# Patient Record
Sex: FEMALE | Race: WHITE | Employment: FULL TIME | URBAN - METROPOLITAN AREA
[De-identification: names, ages, dates, MRNs, and addresses within clinical notes are randomized per-mention and may not be internally consistent; named-entity substitution may affect disease eponyms.]

---

## 2017-06-28 PROBLEM — M51.26 PROTRUDED LUMBAR DISC: Chronic | Status: ACTIVE | Noted: 2017-06-28

## 2017-06-28 PROBLEM — M46.1 SACROILIITIS (HCC): Chronic | Status: ACTIVE | Noted: 2017-06-28

## 2017-07-19 PROBLEM — M54.16 LUMBAR RADICULOPATHY: Status: ACTIVE | Noted: 2017-07-19

## 2018-04-23 ENCOUNTER — PROCEDURE VISIT (OUTPATIENT)
Dept: AUDIOLOGY | Age: 33
End: 2018-04-23
Payer: COMMERCIAL

## 2018-04-23 ENCOUNTER — OFFICE VISIT (OUTPATIENT)
Dept: ENT CLINIC | Age: 33
End: 2018-04-23
Payer: COMMERCIAL

## 2018-04-23 VITALS
HEIGHT: 63 IN | HEART RATE: 60 BPM | SYSTOLIC BLOOD PRESSURE: 112 MMHG | WEIGHT: 139.2 LBS | BODY MASS INDEX: 24.66 KG/M2 | DIASTOLIC BLOOD PRESSURE: 61 MMHG

## 2018-04-23 DIAGNOSIS — M26.621 ARTHRALGIA OF RIGHT TEMPOROMANDIBULAR JOINT: Primary | ICD-10-CM

## 2018-04-23 DIAGNOSIS — H93.19 TINNITUS, UNSPECIFIED LATERALITY: Primary | ICD-10-CM

## 2018-04-23 DIAGNOSIS — M26.622 ARTHRALGIA OF LEFT TEMPOROMANDIBULAR JOINT: ICD-10-CM

## 2018-04-23 PROCEDURE — 99213 OFFICE O/P EST LOW 20 MIN: CPT | Performed by: OTOLARYNGOLOGY

## 2018-04-23 PROCEDURE — 4004F PT TOBACCO SCREEN RCVD TLK: CPT | Performed by: OTOLARYNGOLOGY

## 2018-04-23 PROCEDURE — G8420 CALC BMI NORM PARAMETERS: HCPCS | Performed by: OTOLARYNGOLOGY

## 2018-04-23 PROCEDURE — 92557 COMPREHENSIVE HEARING TEST: CPT | Performed by: AUDIOLOGIST

## 2018-04-23 PROCEDURE — G8427 DOCREV CUR MEDS BY ELIG CLIN: HCPCS | Performed by: OTOLARYNGOLOGY

## 2018-04-23 PROCEDURE — 92567 TYMPANOMETRY: CPT | Performed by: AUDIOLOGIST

## 2018-04-23 RX ORDER — IBUPROFEN 200 MG
200 TABLET ORAL EVERY 6 HOURS PRN
COMMUNITY
End: 2020-09-15 | Stop reason: ALTCHOICE

## 2018-04-23 RX ORDER — MELOXICAM 7.5 MG/1
7.5 TABLET ORAL 2 TIMES DAILY
Qty: 30 TABLET | Refills: 3 | Status: SHIPPED
Start: 2018-04-23 | End: 2022-06-27

## 2018-05-07 ENCOUNTER — TELEPHONE (OUTPATIENT)
Dept: ENT CLINIC | Age: 33
End: 2018-05-07

## 2018-05-10 ASSESSMENT — ENCOUNTER SYMPTOMS
SINUS PAIN: 0
SHORTNESS OF BREATH: 0
SORE THROAT: 0
COUGH: 0
DOUBLE VISION: 0
STRIDOR: 0
BLURRED VISION: 0
HEARTBURN: 0
VOMITING: 0

## 2020-02-27 ENCOUNTER — TELEPHONE (OUTPATIENT)
Dept: ENT CLINIC | Age: 35
End: 2020-02-27

## 2020-02-27 NOTE — TELEPHONE ENCOUNTER
Patient called in to be seen today or tomorrow, patient states she has a lump on the back of her head she has been going back and forth about with her PCP and Dr. Adia Ruvalcaba, she states it went away and now it is back and she has been dealing with this for 3 years, patient would like an appt asap.  Best call back is [phone]

## 2020-09-15 ENCOUNTER — HOSPITAL ENCOUNTER (EMERGENCY)
Age: 35
Discharge: HOME | End: 2020-09-15
Payer: COMMERCIAL

## 2020-09-15 VITALS
WEIGHT: 120 LBS | SYSTOLIC BLOOD PRESSURE: 126 MMHG | OXYGEN SATURATION: 99 % | TEMPERATURE: 98.4 F | RESPIRATION RATE: 14 BRPM | DIASTOLIC BLOOD PRESSURE: 58 MMHG | BODY MASS INDEX: 22.08 KG/M2 | HEART RATE: 64 BPM | HEIGHT: 62 IN

## 2020-09-15 PROCEDURE — 96375 TX/PRO/DX INJ NEW DRUG ADDON: CPT

## 2020-09-15 PROCEDURE — 2580000003 HC RX 258: Performed by: PHYSICIAN ASSISTANT

## 2020-09-15 PROCEDURE — 99283 EMERGENCY DEPT VISIT LOW MDM: CPT

## 2020-09-15 PROCEDURE — 6360000002 HC RX W HCPCS: Performed by: PHYSICIAN ASSISTANT

## 2020-09-15 PROCEDURE — 96374 THER/PROPH/DIAG INJ IV PUSH: CPT

## 2020-09-15 RX ORDER — IBUPROFEN 600 MG/1
600 TABLET ORAL EVERY 6 HOURS PRN
Qty: 40 TABLET | Refills: 0 | Status: SHIPPED | OUTPATIENT
Start: 2020-09-15

## 2020-09-15 RX ORDER — DIPHENHYDRAMINE HYDROCHLORIDE 50 MG/ML
25 INJECTION INTRAMUSCULAR; INTRAVENOUS ONCE
Status: COMPLETED | OUTPATIENT
Start: 2020-09-15 | End: 2020-09-15

## 2020-09-15 RX ORDER — METOCLOPRAMIDE HYDROCHLORIDE 5 MG/ML
10 INJECTION INTRAMUSCULAR; INTRAVENOUS ONCE
Status: COMPLETED | OUTPATIENT
Start: 2020-09-15 | End: 2020-09-15

## 2020-09-15 RX ORDER — DIAZEPAM 5 MG/1
5 TABLET ORAL EVERY 6 HOURS PRN
Qty: 10 TABLET | Refills: 0 | Status: SHIPPED | OUTPATIENT
Start: 2020-09-15 | End: 2020-09-18

## 2020-09-15 RX ORDER — DIAZEPAM 5 MG/ML
5 INJECTION, SOLUTION INTRAMUSCULAR; INTRAVENOUS ONCE
Status: COMPLETED | OUTPATIENT
Start: 2020-09-15 | End: 2020-09-15

## 2020-09-15 RX ORDER — 0.9 % SODIUM CHLORIDE 0.9 %
1000 INTRAVENOUS SOLUTION INTRAVENOUS ONCE
Status: COMPLETED | OUTPATIENT
Start: 2020-09-15 | End: 2020-09-15

## 2020-09-15 RX ORDER — KETOROLAC TROMETHAMINE 30 MG/ML
30 INJECTION, SOLUTION INTRAMUSCULAR; INTRAVENOUS ONCE
Status: COMPLETED | OUTPATIENT
Start: 2020-09-15 | End: 2020-09-15

## 2020-09-15 RX ADMIN — SODIUM CHLORIDE 1000 ML: 9 INJECTION, SOLUTION INTRAVENOUS at 19:12

## 2020-09-15 RX ADMIN — DIAZEPAM 5 MG: 5 INJECTION, SOLUTION INTRAMUSCULAR; INTRAVENOUS at 20:02

## 2020-09-15 RX ADMIN — KETOROLAC TROMETHAMINE 30 MG: 30 INJECTION, SOLUTION INTRAMUSCULAR; INTRAVENOUS at 19:13

## 2020-09-15 RX ADMIN — METOCLOPRAMIDE HYDROCHLORIDE 10 MG: 5 INJECTION INTRAMUSCULAR; INTRAVENOUS at 19:13

## 2020-09-15 RX ADMIN — DIPHENHYDRAMINE HYDROCHLORIDE 25 MG: 50 INJECTION, SOLUTION INTRAMUSCULAR; INTRAVENOUS at 19:13

## 2020-09-15 ASSESSMENT — PAIN DESCRIPTION - LOCATION: LOCATION: HEAD

## 2020-09-15 ASSESSMENT — PAIN SCALES - GENERAL: PAINLEVEL_OUTOF10: 9

## 2020-09-15 ASSESSMENT — PAIN - FUNCTIONAL ASSESSMENT: PAIN_FUNCTIONAL_ASSESSMENT: PREVENTS OR INTERFERES SOME ACTIVE ACTIVITIES AND ADLS

## 2020-09-15 ASSESSMENT — PAIN DESCRIPTION - DESCRIPTORS: DESCRIPTORS: ACHING;DISCOMFORT

## 2020-09-15 NOTE — ED PROVIDER NOTES
[facility]                                                                                                                                      Department of Emergency Medicine   ED  Provider Note  Admit Date/RoomTime: 9/15/2020  6:41 PM  ED Room: 25/25        HPI:  9/15/20,   Time: 7:46 PM EDT         April D Alan Steele is a 38 y.o. female presenting to the ED for right sided neck pain radiating to head, beginning 5 days ago. The complaint has been persistent, moderate in severity, and worsened by movement of head and neck. The patient states that since last Thursday she has had right-sided neck pain that radiates to the right side of her head. She states that the pain is aggravated with any movement. There was no fall or trauma. She denies any recent history of headaches. She states that when she was much younger she did have some mild sinus issues and allergies. She did have a low-grade temp last week and was sent home from work. She has some mild nasal congestion but no significant facial pain or postnasal drip. The patient states that she is tried over-the-counter meds without relief of her headache. Denies visual changes.   She has had mild nausea but no vomiting or fever    ROS:     Constitutional: Negative for fever and chills  HENT: Negative for ear pain, sore throat and sinus pressure  Eyes: Negative for pain, discharge and redness  Respiratory:  Negative for shortness of breath, cough and wheezing  Cardiovascular: Negative for CP, edema or palpitations  Gastrointestinal: Negative for nausea, vomiting, diarrhea and abdominal distention  Genitourinary: Negative for dysuria and frequency  Musculoskeletal: Negative for back pain and arthralgia  Skin: Negative for rash and wound  Neurological: Negative for weakness and headaches  Hematological: Negative for adenopathy    All other systems reviewed and are negative      -------------------------------- PAST HISTORY ----------------------------------  Past Medical History:  has a past medical history of Chronic pain, Marijuana use, and OCD (obsessive compulsive disorder). Past Surgical History:  has a past surgical history that includes Appendectomy; Tubal ligation; Foot surgery; Ovarian cyst surgery; Nose surgery (Bilateral, 8/13/15); and other surgical history (N/A, 07/19/2017). Social History:  reports that she has been smoking cigarettes. She has a 20.00 pack-year smoking history. She has never used smokeless tobacco. She reports current alcohol use. She reports current drug use. Drug: Marijuana. Family History: family history is not on file. The patients home medications have been reviewed. Allergies: Tape [adhesive tape]    --------------------------------- RESULTS ------------------------------------------  All laboratory and radiology results have been personally reviewed by myself   LABS:  No results found for this visit on 09/15/20. RADIOLOGY:  Interpreted by Radiologist.  No orders to display       ----------------- NURSING NOTES AND VITALS REVIEWED ---------------   The nursing notes within the ED encounter and vital signs as below have been reviewed. BP (!) 126/58   Pulse 64   Temp 98.4 °F (36.9 °C) (Oral)   Resp 14   Ht 5' 2\" (1.575 m)   Wt 120 lb (54.4 kg)   LMP 03/20/2018   SpO2 99%   BMI 21.95 kg/m²   Oxygen Saturation Interpretation: Normal      --------------------------------PHYSICAL EXAM------------------------------------      Constitutional/General: Alert and oriented x3, well appearing, non toxic in NAD  Head: NC/AT  Eyes: PERRL, EOMI  Mouth: Oropharynx clear, handling secretions, no trismus  Neck: Supple, full ROM, no meningeal signs  Pulmonary: Lungs clear to auscultation bilaterally, no wheezes, rales, or rhonchi. Not in respiratory distress  Cardiovascular:  Regular rate and rhythm, no murmurs, gallops, or rubs. 2+ distal pulses  Abdomen: Soft, + BS. No distension. Nontender. No palpable rigidity, rebound or guarding  Extremities: Moves all extremities x 4. Warm and well perfused  Skin: warm and dry without rash  Neurologic: GCS 15,  Intact. No focal deficits  Psych: Normal Affect      ------------------------ ED COURSE/MEDICAL DECISION MAKING----------------------  Medications   0.9 % sodium chloride bolus (0 mLs Intravenous Stopped 9/15/20 2012)   ketorolac (TORADOL) injection 30 mg (30 mg Intravenous Given 9/15/20 1913)   metoclopramide (REGLAN) injection 10 mg (10 mg Intravenous Given 9/15/20 1913)   diphenhydrAMINE (BENADRYL) injection 25 mg (25 mg Intravenous Given 9/15/20 1913)   diazePAM (VALIUM) injection 5 mg (5 mg Intravenous Given 9/15/20 2002)         Medical Decision Making:    Patient is feeling much better after the meds here. She got significant relief initially and then was still having some right-sided neck discomfort. She was then given a muscle relaxer. Plan discharge home with Motrin and Valium. She can use ice or heat as needed. Follow-up with PCP advised if persistent symptoms       Counseling: The emergency provider has spoken with the patient and discussed todays results, in addition to providing specific details for the plan of care and counseling regarding the diagnosis and prognosis. Questions are answered at this time and they are agreeable with the plan.      ------------------------ IMPRESSION AND DISPOSITION -------------------------------    IMPRESSION  1. Tension headache    2.  Spasm of muscle        DISPOSITION  Disposition: Discharge to home  Patient condition is stable                   Charles Celestin PA-C  09/15/20 2036

## 2020-09-16 NOTE — ED NOTES
Discharge instructions given, medications and follow up instructions reviewed. Patient verbalized understanding, no other noted or stated problems at this time. Patient will follow up with physicians as directed.       Link Eckert RN  09/15/20 0930

## 2022-06-27 ENCOUNTER — HOSPITAL ENCOUNTER (EMERGENCY)
Age: 37
Discharge: HOME | End: 2022-06-27
Payer: COMMERCIAL

## 2022-06-27 ENCOUNTER — APPOINTMENT (OUTPATIENT)
Dept: GENERAL RADIOLOGY | Age: 37
End: 2022-06-27
Payer: COMMERCIAL

## 2022-06-27 VITALS
SYSTOLIC BLOOD PRESSURE: 102 MMHG | BODY MASS INDEX: 22.15 KG/M2 | RESPIRATION RATE: 16 BRPM | WEIGHT: 125 LBS | HEART RATE: 62 BPM | DIASTOLIC BLOOD PRESSURE: 56 MMHG | TEMPERATURE: 98.4 F | HEIGHT: 63 IN | OXYGEN SATURATION: 100 %

## 2022-06-27 DIAGNOSIS — S93.401A SPRAIN OF RIGHT ANKLE, UNSPECIFIED LIGAMENT, INITIAL ENCOUNTER: Primary | ICD-10-CM

## 2022-06-27 DIAGNOSIS — S93.601A SPRAIN OF RIGHT FOOT, INITIAL ENCOUNTER: ICD-10-CM

## 2022-06-27 PROCEDURE — 99283 EMERGENCY DEPT VISIT LOW MDM: CPT

## 2022-06-27 PROCEDURE — 73630 X-RAY EXAM OF FOOT: CPT

## 2022-06-27 PROCEDURE — 73610 X-RAY EXAM OF ANKLE: CPT

## 2022-06-27 PROCEDURE — 6370000000 HC RX 637 (ALT 250 FOR IP): Performed by: NURSE PRACTITIONER

## 2022-06-27 RX ORDER — IBUPROFEN 600 MG/1
600 TABLET ORAL 3 TIMES DAILY PRN
Qty: 30 TABLET | Refills: 0 | Status: SHIPPED | OUTPATIENT
Start: 2022-06-27

## 2022-06-27 RX ORDER — IBUPROFEN 800 MG/1
800 TABLET ORAL ONCE
Status: COMPLETED | OUTPATIENT
Start: 2022-06-27 | End: 2022-06-27

## 2022-06-27 RX ADMIN — IBUPROFEN 800 MG: 800 TABLET, FILM COATED ORAL at 19:05

## 2022-06-27 ASSESSMENT — PAIN DESCRIPTION - ORIENTATION: ORIENTATION: RIGHT

## 2022-06-27 ASSESSMENT — PAIN DESCRIPTION - DESCRIPTORS: DESCRIPTORS: THROBBING;SHOOTING;DISCOMFORT

## 2022-06-27 ASSESSMENT — PAIN - FUNCTIONAL ASSESSMENT: PAIN_FUNCTIONAL_ASSESSMENT: 0-10

## 2022-06-27 ASSESSMENT — PAIN SCALES - GENERAL
PAINLEVEL_OUTOF10: 7
PAINLEVEL_OUTOF10: 6

## 2022-06-27 ASSESSMENT — PAIN DESCRIPTION - LOCATION: LOCATION: FOOT

## 2022-06-27 NOTE — Clinical Note
Ashley Castro was seen and treated in our emergency department on 6/27/2022. She may return to work on 07/01/2022. If you have any questions or concerns, please don't hesitate to call.       Tracie Masters, JANEL - CNP

## 2022-06-28 NOTE — ED PROVIDER NOTES
[facility]  Department of Emergency Medicine   ED  Encounter Note  Admit Date/RoomTime: 2022  6:51 PM  ED Room: BRYN/BRYN    NAME: Ashley Espana  : 1985  MRN: [id number]     Chief Complaint:  Foot Injury (right heel pain after jumping in pool)    History of Present Illness         Ashley Dobbs is a 44 y.o. old female presenting to the emergency department by private vehicle, for traumatic Right foot and ankle pain which occured several hour(s) prior to arrival.  The complaint is due to being jumping into a pool chasing after her child who had fallen in accidentally. Patient states that when she got out of the pool she started to have pain in her heel and foot. Since onset the symptoms have been waxing and waning with inability to bear weight. Patient has no prior history of pain/injury with regards to today's visit. Her pain is aggraveated by certain movements or pressure on or palpation of painful area and relieved by nothing, as no treatment has been provided prior to this visit. She denies any head injury, headache, loss of consciousness, confusion, dizziness, neck pain, chest pain, abdominal pain, back pain, extremity injury, numbness or weakness. Tetanus Status: up to date. ROS   Pertinent positives and negatives are stated within HPI, all other systems reviewed and are negative. Past Medical History:  has a past medical history of Chronic pain, Marijuana use, and OCD (obsessive compulsive disorder). Surgical History:  has a past surgical history that includes Appendectomy; Tubal ligation; Foot surgery; Ovarian cyst surgery; Nose surgery (Bilateral, 8/13/15); and other surgical history (N/A, 2017). Social History:  reports that she has been smoking cigarettes. She has a 20.00 pack-year smoking history. She has never used smokeless tobacco. She reports current alcohol use. She reports current drug use. Drug: Marijuana [employer]).     Family History: family history is not on file. Allergies: Tape [adhesive tape]    Physical Exam   Oxygen Saturation Interpretation: Normal.        ED Triage Vitals [06/27/22 1643]   BP Temp Temp src Heart Rate Resp SpO2 Height Weight   (!) 102/56 98.4 °F (36.9 °C) -- 62 16 100 % 5' 3\" (1.6 m) 125 lb (56.7 kg)         Constitutional:  Alert, development consistent with age. Neck:  Normal ROM. Supple. Right Ankle: diffusely across ankle              Tenderness:  mild. Swelling: None. Deformity: no deformity observed/palpated. ROM: full range with pain. Skin:  Tenderness, Achilles tendon is intact. There is no erythema ecchymosis edema hematoma laceration or abrasions noted       Neurovascular: Motor deficit: none. Sensory deficit:   none. Pulse deficit: none. Capillary refill: normal.  Right Knee:              Tenderness:  none. Swelling: None. Deformity: no deformity observed/palpated. ROM: full range of motion. Skin:  no wounds, erythema, or swelling. Right Foot: diffusely across entire foot              Tenderness:  mild. Swelling: None. Deformity: no deformity observed/palpated. ROM: full range of motion. Skin:  no wounds, erythema, or swelling  Gait:  normal.   Lymphatics: No lymphangitis or adenopathy noted. Neurological:  Oriented. Motor functions intact. Lab / Imaging Results   (All laboratory and radiology results have been personally reviewed by myself)  Labs:  No results found for this visit on 06/27/22. Imaging: All Radiology results interpreted by Radiologist unless otherwise noted. XR FOOT RIGHT (MIN 3 VIEWS)   Final Result   No fracture or dislocation of the right ankle or foot. XR ANKLE RIGHT (MIN 3 VIEWS)   Final Result   No fracture or dislocation of the right ankle or foot.            ED Course / Medical Decision Making     Medications   ibuprofen (ADVIL;MOTRIN) tablet 800 mg (800 mg Oral Given 6/27/22 1905)        Consults:   None    Procedure(s):  None    MDM:      Imaging was obtained based on moderate suspicion for fracture / bony abnormality as per history/physical findings. Plan is subsequently for symptom control, limited use and immobilization with outpatient follow-up with pcp as instructed in d/c instructions and with on-call orthopaedist as instructed in d/c instructions. Was placed in Ace wrap Aircast and given crutches for her symptoms. Patient educated on ice anti-inflammatory medication and elevation. Patient agreeable to plan of care she is nontoxic in appearance and in no distress. Patient at this time is agreeable with the plan of care. Plan of Care/Counseling:  JANEL Wright CNP reviewed today's visit with the patient in addition to providing specific details for the plan of care and counseling regarding the diagnosis and prognosis. Questions are answered at this time and are agreeable with the plan. Assessment      1. Sprain of right ankle, unspecified ligament, initial encounter    2. Sprain of right foot, initial encounter      Plan   Discharged home. Patient condition is good    New Medications     Discharge Medication List as of 6/27/2022  7:42 PM      START taking these medications    Details   !! ibuprofen (ADVIL;MOTRIN) 600 MG tablet Take 1 tablet by mouth 3 times daily as needed for Pain, Disp-30 tablet, R-0Normal       !! - Potential duplicate medications found. Please discuss with provider. Electronically signed by JANEL rWight CNP   DD: 6/28/22  **This report was transcribed using voice recognition software. Every effort was made to ensure accuracy; however, inadvertent computerized transcription errors may be present.   END OF ED PROVIDER NOTE       JANEL Shankar CNP  06/28/22 6304

## 2022-07-21 ENCOUNTER — HOSPITAL ENCOUNTER (OUTPATIENT)
Dept: GENERAL RADIOLOGY | Age: 37
Discharge: HOME | End: 2022-07-23
Payer: COMMERCIAL

## 2022-07-21 DIAGNOSIS — Z13.820 SCREENING FOR OSTEOPOROSIS: ICD-10-CM

## 2022-07-21 PROCEDURE — 77080 DXA BONE DENSITY AXIAL: CPT

## 2023-02-21 ENCOUNTER — HOSPITAL ENCOUNTER (EMERGENCY)
Age: 38
Discharge: HOME | End: 2023-02-21
Attending: EMERGENCY MEDICINE
Payer: COMMERCIAL

## 2023-02-21 ENCOUNTER — APPOINTMENT (OUTPATIENT)
Dept: GENERAL RADIOLOGY | Age: 38
End: 2023-02-21
Payer: COMMERCIAL

## 2023-02-21 ENCOUNTER — APPOINTMENT (OUTPATIENT)
Dept: CT IMAGING | Age: 38
End: 2023-02-21
Payer: COMMERCIAL

## 2023-02-21 VITALS
TEMPERATURE: 98.6 F | BODY MASS INDEX: 21.26 KG/M2 | SYSTOLIC BLOOD PRESSURE: 105 MMHG | WEIGHT: 120 LBS | RESPIRATION RATE: 16 BRPM | OXYGEN SATURATION: 98 % | HEART RATE: 78 BPM | DIASTOLIC BLOOD PRESSURE: 60 MMHG

## 2023-02-21 DIAGNOSIS — G89.29 ACUTE EXACERBATION OF CHRONIC LOW BACK PAIN: Primary | ICD-10-CM

## 2023-02-21 DIAGNOSIS — M54.50 ACUTE EXACERBATION OF CHRONIC LOW BACK PAIN: Primary | ICD-10-CM

## 2023-02-21 LAB
INFLUENZA A: NOT DETECTED
INFLUENZA B: NOT DETECTED
SARS-COV-2 RNA, RT PCR: NOT DETECTED

## 2023-02-21 PROCEDURE — 87636 SARSCOV2 & INF A&B AMP PRB: CPT

## 2023-02-21 PROCEDURE — 72131 CT LUMBAR SPINE W/O DYE: CPT

## 2023-02-21 PROCEDURE — 6370000000 HC RX 637 (ALT 250 FOR IP)

## 2023-02-21 PROCEDURE — 96372 THER/PROPH/DIAG INJ SC/IM: CPT

## 2023-02-21 PROCEDURE — 71046 X-RAY EXAM CHEST 2 VIEWS: CPT

## 2023-02-21 PROCEDURE — 6360000002 HC RX W HCPCS

## 2023-02-21 PROCEDURE — 99284 EMERGENCY DEPT VISIT MOD MDM: CPT

## 2023-02-21 PROCEDURE — 2580000003 HC RX 258

## 2023-02-21 PROCEDURE — 96374 THER/PROPH/DIAG INJ IV PUSH: CPT

## 2023-02-21 PROCEDURE — 96375 TX/PRO/DX INJ NEW DRUG ADDON: CPT

## 2023-02-21 RX ORDER — FENTANYL CITRATE 50 UG/ML
25 INJECTION, SOLUTION INTRAMUSCULAR; INTRAVENOUS ONCE
Status: COMPLETED | OUTPATIENT
Start: 2023-02-21 | End: 2023-02-21

## 2023-02-21 RX ORDER — 0.9 % SODIUM CHLORIDE 0.9 %
1000 INTRAVENOUS SOLUTION INTRAVENOUS ONCE
Status: COMPLETED | OUTPATIENT
Start: 2023-02-21 | End: 2023-02-21

## 2023-02-21 RX ORDER — KETOROLAC TROMETHAMINE 30 MG/ML
15 INJECTION, SOLUTION INTRAMUSCULAR; INTRAVENOUS ONCE
Status: COMPLETED | OUTPATIENT
Start: 2023-02-21 | End: 2023-02-21

## 2023-02-21 RX ORDER — METHYLPREDNISOLONE SODIUM SUCCINATE 125 MG/2ML
60 INJECTION, POWDER, LYOPHILIZED, FOR SOLUTION INTRAMUSCULAR; INTRAVENOUS DAILY
Status: DISCONTINUED | OUTPATIENT
Start: 2023-02-21 | End: 2023-02-21 | Stop reason: HOSPADM

## 2023-02-21 RX ORDER — ORPHENADRINE CITRATE 100 MG/1
100 TABLET, EXTENDED RELEASE ORAL 2 TIMES DAILY
Qty: 14 TABLET | Refills: 0 | Status: SHIPPED | OUTPATIENT
Start: 2023-02-21 | End: 2023-02-28

## 2023-02-21 RX ORDER — PREDNISONE 10 MG/1
40 TABLET ORAL DAILY
Qty: 20 TABLET | Refills: 0 | Status: SHIPPED | OUTPATIENT
Start: 2023-02-21 | End: 2023-02-26

## 2023-02-21 RX ORDER — LIDOCAINE AND PRILOCAINE 25; 25 MG/G; MG/G
CREAM TOPICAL ONCE
Status: DISCONTINUED | OUTPATIENT
Start: 2023-02-21 | End: 2023-02-21

## 2023-02-21 RX ORDER — LIDOCAINE 4 G/G
PATCH TOPICAL
Status: COMPLETED
Start: 2023-02-21 | End: 2023-02-21

## 2023-02-21 RX ORDER — LIDOCAINE 50 MG/G
1 PATCH TOPICAL EVERY 24 HOURS
Qty: 10 PATCH | Refills: 0 | Status: SHIPPED | OUTPATIENT
Start: 2023-02-21 | End: 2023-03-03

## 2023-02-21 RX ORDER — ORPHENADRINE CITRATE 30 MG/ML
60 INJECTION INTRAMUSCULAR; INTRAVENOUS ONCE
Status: COMPLETED | OUTPATIENT
Start: 2023-02-21 | End: 2023-02-21

## 2023-02-21 RX ADMIN — SODIUM CHLORIDE 1000 ML: 9 INJECTION, SOLUTION INTRAVENOUS at 10:11

## 2023-02-21 RX ADMIN — KETOROLAC TROMETHAMINE 15 MG: 30 INJECTION, SOLUTION INTRAMUSCULAR; INTRAVENOUS at 10:12

## 2023-02-21 RX ADMIN — ORPHENADRINE CITRATE 60 MG: 30 INJECTION INTRAMUSCULAR; INTRAVENOUS at 10:12

## 2023-02-21 RX ADMIN — FENTANYL CITRATE 25 MCG: 50 INJECTION, SOLUTION INTRAMUSCULAR; INTRAVENOUS at 10:12

## 2023-02-21 RX ADMIN — METHYLPREDNISOLONE SODIUM SUCCINATE 60 MG: 125 INJECTION, POWDER, FOR SOLUTION INTRAMUSCULAR; INTRAVENOUS at 10:13

## 2023-02-21 ASSESSMENT — PAIN DESCRIPTION - PAIN TYPE: TYPE: ACUTE PAIN;CHRONIC PAIN

## 2023-02-21 ASSESSMENT — PAIN SCALES - GENERAL
PAINLEVEL_OUTOF10: 7
PAINLEVEL_OUTOF10: 10
PAINLEVEL_OUTOF10: 10

## 2023-02-21 ASSESSMENT — PAIN DESCRIPTION - ONSET: ONSET: ON-GOING

## 2023-02-21 ASSESSMENT — PAIN DESCRIPTION - DESCRIPTORS: DESCRIPTORS: SHARP;SHOOTING

## 2023-02-21 ASSESSMENT — PAIN DESCRIPTION - LOCATION
LOCATION: BACK

## 2023-02-21 ASSESSMENT — PAIN - FUNCTIONAL ASSESSMENT
PAIN_FUNCTIONAL_ASSESSMENT: 0-10
PAIN_FUNCTIONAL_ASSESSMENT: PREVENTS OR INTERFERES WITH MANY ACTIVE NOT PASSIVE ACTIVITIES

## 2023-02-21 ASSESSMENT — PAIN DESCRIPTION - FREQUENCY: FREQUENCY: CONTINUOUS

## 2023-02-21 ASSESSMENT — PAIN DESCRIPTION - ORIENTATION: ORIENTATION: RIGHT;LOWER

## 2023-02-21 NOTE — ED PROVIDER NOTES
Ashley Mazariegos is a 43 y.o. female    HPI  Ashley Mazariegos is a 43 y.o. female presenting to the ED for Back Pain (States having pain in lower back for a few weeks, (hx of back issues). Caught a cold last week (covid neg) and having increasing pain with coughing. Today coughing spell sent her to her knees.)    History comes primarily from the patient. Presents for acute on chronic right-sided low back pain with sciatica. The patient has had this issue before and states she inflamed it about a month ago. However over the last week she has developed cold type symptoms especially including a cough. However the coughing has made her back hurt even worse and now she has difficulty actually coughing due to the pain. The patient has been taking ibuprofen at home without significant relief. She says she did do a COVID test few days ago which was negative. Hx of tubal ligation. ROS  Full review of systems completed. Pertinent positives and negatives per the HPI, unless otherwise stated ROS is negative. Physical Exam  Vitals and nursing note reviewed. Constitutional:       General: She is not in acute distress. Appearance: Normal appearance. She is normal weight. She is not ill-appearing. HENT:      Head: Normocephalic and atraumatic. Right Ear: External ear normal.      Left Ear: External ear normal.      Nose: Nose normal. No rhinorrhea. Mouth/Throat:      Mouth: Mucous membranes are moist.      Pharynx: Oropharynx is clear. Eyes:      Extraocular Movements: Extraocular movements intact. Conjunctiva/sclera: Conjunctivae normal.      Pupils: Pupils are equal, round, and reactive to light. Cardiovascular:      Rate and Rhythm: Normal rate and regular rhythm. Pulses: Normal pulses. Heart sounds: Normal heart sounds. No murmur heard. Pulmonary:      Effort: Pulmonary effort is normal. No respiratory distress. Breath sounds: Normal breath sounds.  No wheezing, rhonchi or rales.   Abdominal:      General: Bowel sounds are normal. There is no distension. Palpations: Abdomen is soft. Tenderness: There is no abdominal tenderness. Musculoskeletal:         General: No swelling or deformity. Normal range of motion. Cervical back: Normal range of motion and neck supple. No rigidity. Comments: Right-sided tenderness to the back, paraspinous muscles. No midline tenderness   Skin:     General: Skin is warm and dry. Coloration: Skin is not jaundiced or pale. Neurological:      General: No focal deficit present. Mental Status: She is alert and oriented to person, place, and time. Mental status is at baseline. Motor: No weakness. Psychiatric:         Mood and Affect: Mood normal.         Behavior: Behavior normal.          Medical Decision Making/Differential Diagnosis:    CC/HPI Summary, social determinants of health, records reviewed, DDX, testing done/not done, ED course, reassessment, disposition considerations/shared decision making with patient, consults, disposition:    Medical Decision Making  Amount and/or Complexity of Data Reviewed  Radiology: ordered. Risk  Prescription drug management. Viral swabs are ordered to evaluate possible viral etiology of symptoms. Labs are independently interpreted by me. COVID and flu are negative. Chest x-ray and CT of the lumbar spine show no acute abnormalities. There are degenerative changes of the lumbar spine. Patient has chronic back pain and has followed with pain management in the past but has fallen out of touch with pain management. Here in the emergency room, she was treated with a lidocaine patch, Solu-Medrol, Norflex, Toradol and fentanyl as well as a bolus of fluid. She feels significantly better, although her pain is not completely resolved. The patient will be prescribed Lidoderm patch, Norflex and prednisone for home.   She is encouraged to follow-up with her pain management doctor for further management of pain. Patient did not have any urinary hesitancy or incontinence, saddle paresthesias, history of drug use, fevers. And so no concern for epidural abscess. Patient to be discharged home. She states her understanding and agrees with plan. RADIOLOGY:   Non-plain film images such as CT, Ultrasound and MRI are read by the radiologist. Plain radiographic images are visualized and preliminarily interpreted by myself with the below findings:  Chest x-ray shows no focal findings with no focal consolidations, pneumothorax or bony abnormalities. Past medical history/chonic conditions affecting care   has a past medical history of Chronic pain, Marijuana use, and OCD (obsessive compulsive disorder). Social History     Tobacco Use    Smoking status: Every Day     Packs/day: 1.00     Years: 20.00     Pack years: 20.00     Types: Cigarettes    Smokeless tobacco: Never   Substance Use Topics    Alcohol use: Yes     Comment: o0cc    Drug use: Yes     Types: Marijuana (Weed)     Comment: occassional         As interpreted by me, the below displayed labs are abnormal. All other labs obtained during this visit were within normal range or not returned as of this dictation. Labs Reviewed   COVID-19 & INFLUENZA COMBO       Interpretation per the Radiologist below, if available at the time of this note:  Laurent   Final Result   1. No acute osseous abnormality. 2. 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 MRI could be helpful   for further evaluation. RECOMMENDATIONS:   Unavailable         XR CHEST (2 VW)   Final Result   No acute process. No results found. No results found.       Emergency Department Course  Vitals:    Vitals:    02/21/23 0846 02/21/23 1314   BP: (!) 102/56 105/60   Pulse: 77 78   Resp: 16 16   Temp: 98.6 °F (37 °C)    TempSrc: Oral    SpO2: 98% 98%   Weight: 120 lb (54.4 kg)        Patient was given the following medications:  Medications   methylPREDNISolone sodium (SOLU-MEDROL) injection 60 mg (60 mg IntraVENous Given 2/21/23 1013)   ketorolac (TORADOL) injection 15 mg (15 mg IntraVENous Given 2/21/23 1012)   fentaNYL (SUBLIMAZE) injection 25 mcg (25 mcg IntraVENous Given 2/21/23 1012)   0.9 % sodium chloride bolus (0 mLs IntraVENous Stopped 2/21/23 1214)   orphenadrine (NORFLEX) injection 60 mg (60 mg IntraMUSCular Given 2/21/23 1012)   lidocaine 4 % 4 % external patch (1 patch  Patch Applied 2/21/23 0997)       ED Course as of 02/21/23 1500   Tue Feb 21, 2023   1251 CT Lumbar:   1. No acute osseous abnormality. 2. 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 [KS]      ED Course User Index  [KS] Wilma Gibbons MD          I am the Primary Clinician of Record. Final impression  1.  Acute exacerbation of chronic low back pain          Disposition/plan  DISPOSITION Decision To Discharge 02/21/2023 12:56:55 PM    PATIENT REFERRED TO:  Cherie Burr MD  [facility] [address] [id number]  [phone]    Call in 3 days  As needed    Your pain management    Call in 1 day      DISCHARGE MEDICATIONS:  Discharge Medication List as of 2/21/2023 12:57 PM        START taking these medications    Details   lidocaine (LIDODERM) 5 % Place 1 patch onto the skin every 24 hours for 10 days 12 hours on, 12 hours off., Disp-10 patch, R-0Normal      predniSONE (DELTASONE) 10 MG tablet Take 4 tablets by mouth daily for 5 days, Disp-20 tablet, R-0Normal      orphenadrine (NORFLEX) 100 MG extended release tablet Take 1 tablet by mouth 2 times daily for 7 days, Disp-14 tablet, R-0Normal             DISCONTINUED MEDICATIONS:  Discharge Medication List as of 2/21/2023 12:57 PM        STOP taking these medications       ibuprofen (ADVIL;MOTRIN) 600 MG tablet Comments:   Reason for Stopping:         ibuprofen (IBU) 600 MG tablet Comments:   Reason for Stopping:                 Periodic Controlled Substance Monitoring: No signs of potential drug abuse or diversion identified.  (Eric Lambert, )    (Please note that portions of this note were completed with a voice recognition program.  Efforts were made to edit the dictations but occasionally words are mis-transcribed.)         Alex Saini MD  Resident  02/21/23 1500

## 2023-02-21 NOTE — ED NOTES
Radiology Procedure Waiver   Name: Jian Olivas  : 1985  MRN: [id number]    Date:  23    Time: 9:54 AM EST    Benefits of immediately proceeding with radiology exam(s) without pre-testing outweigh the risks or are not indicated as specified below and therefore the following is/are being waived:    [] Benefits of immediate radiology exam(s) outweigh any risk. OR    Pre-exam testing is not indicated for the following reason(s):  [] Pregnancy test   [] Patients LMP on-time and regular. [x] Patient had Tubal Ligation or has other Contraception Device. [] Patient  is Menopausal or Premenarcheal.    [x] Patient had Full or Partial Hysterectomy. [] Protocol for CT contrast allegry   [] Patient has tolerated well previously   [] Patient does not have a true allergy    [] MRI Questionnaire     [] BUN/Creatinine   [] Patient age w/no hx of renal dysfunction. [] Patient on Dialysis. [] Recent Normal Labs.   Electronically signed by Gogo Douglas MD on 23 at 9:54 AM LEYDA Greenfield MD  Resident  [phone]

## 2023-03-01 DIAGNOSIS — M85.80 SENILE OSTEOPENIA: Primary | ICD-10-CM

## 2023-03-01 RX ORDER — ONDANSETRON 2 MG/ML
8 INJECTION INTRAMUSCULAR; INTRAVENOUS
OUTPATIENT
Start: 2023-03-02

## 2023-03-01 RX ORDER — ACETAMINOPHEN 325 MG/1
650 TABLET ORAL
OUTPATIENT
Start: 2023-03-02

## 2023-03-01 RX ORDER — EPINEPHRINE 1 MG/ML
0.3 INJECTION, SOLUTION, CONCENTRATE INTRAVENOUS PRN
OUTPATIENT
Start: 2023-03-02

## 2023-03-01 RX ORDER — SODIUM CHLORIDE 9 MG/ML
INJECTION, SOLUTION INTRAVENOUS CONTINUOUS
OUTPATIENT
Start: 2023-03-02

## 2023-03-01 RX ORDER — DIPHENHYDRAMINE HYDROCHLORIDE 50 MG/ML
50 INJECTION INTRAMUSCULAR; INTRAVENOUS
OUTPATIENT
Start: 2023-03-02

## 2023-03-01 RX ORDER — ALBUTEROL SULFATE 90 UG/1
4 AEROSOL, METERED RESPIRATORY (INHALATION) PRN
OUTPATIENT
Start: 2023-03-02

## 2023-03-10 ENCOUNTER — OFFICE VISIT (OUTPATIENT)
Dept: PAIN MANAGEMENT | Age: 38
End: 2023-03-10
Payer: COMMERCIAL

## 2023-03-10 ENCOUNTER — PREP FOR PROCEDURE (OUTPATIENT)
Dept: PAIN MANAGEMENT | Age: 38
End: 2023-03-10

## 2023-03-10 VITALS
OXYGEN SATURATION: 98 % | WEIGHT: 120 LBS | HEIGHT: 63 IN | HEART RATE: 83 BPM | BODY MASS INDEX: 21.26 KG/M2 | RESPIRATION RATE: 18 BRPM | TEMPERATURE: 97.5 F | DIASTOLIC BLOOD PRESSURE: 78 MMHG | SYSTOLIC BLOOD PRESSURE: 106 MMHG

## 2023-03-10 DIAGNOSIS — M54.16 LUMBAR RADICULAR PAIN: ICD-10-CM

## 2023-03-10 DIAGNOSIS — F12.91 HISTORY OF MARIJUANA USE: ICD-10-CM

## 2023-03-10 DIAGNOSIS — M51.36 DDD (DEGENERATIVE DISC DISEASE), LUMBAR: ICD-10-CM

## 2023-03-10 DIAGNOSIS — M54.16 LUMBAR RADICULAR PAIN: Primary | ICD-10-CM

## 2023-03-10 DIAGNOSIS — M51.36 DDD (DEGENERATIVE DISC DISEASE), LUMBAR: Primary | ICD-10-CM

## 2023-03-10 DIAGNOSIS — F17.200 SMOKING: ICD-10-CM

## 2023-03-10 PROBLEM — M51.369 DDD (DEGENERATIVE DISC DISEASE), LUMBAR: Status: ACTIVE | Noted: 2023-03-10

## 2023-03-10 PROCEDURE — 99204 OFFICE O/P NEW MOD 45 MIN: CPT | Performed by: ANESTHESIOLOGY

## 2023-03-10 RX ORDER — ASPIRIN 81 MG
TABLET, DELAYED RELEASE (ENTERIC COATED) ORAL
COMMUNITY
Start: 2023-02-25

## 2023-03-10 RX ORDER — CYCLOBENZAPRINE HCL 5 MG
5 TABLET ORAL 2 TIMES DAILY PRN
Qty: 30 TABLET | Refills: 0 | Status: SHIPPED | OUTPATIENT
Start: 2023-03-10 | End: 2023-03-25

## 2023-03-10 RX ORDER — SODIUM CHLORIDE 0.9 % (FLUSH) 0.9 %
5-40 SYRINGE (ML) INJECTION EVERY 12 HOURS SCHEDULED
Status: CANCELLED | OUTPATIENT
Start: 2023-03-10

## 2023-03-10 RX ORDER — SODIUM CHLORIDE 9 MG/ML
INJECTION, SOLUTION INTRAVENOUS PRN
Status: CANCELLED | OUTPATIENT
Start: 2023-03-10

## 2023-03-10 RX ORDER — GABAPENTIN 300 MG/1
CAPSULE ORAL
Qty: 81 CAPSULE | Refills: 0 | Status: SHIPPED | OUTPATIENT
Start: 2023-03-10 | End: 2023-04-09

## 2023-03-10 RX ORDER — SODIUM CHLORIDE 0.9 % (FLUSH) 0.9 %
5-40 SYRINGE (ML) INJECTION PRN
Status: CANCELLED | OUTPATIENT
Start: 2023-03-10

## 2023-03-10 RX ORDER — OXYCODONE HYDROCHLORIDE AND ACETAMINOPHEN 5; 325 MG/1; MG/1
1 TABLET ORAL 2 TIMES DAILY PRN
Qty: 20 TABLET | Refills: 0 | Status: SHIPPED | OUTPATIENT
Start: 2023-03-10 | End: 2023-03-20

## 2023-03-10 NOTE — H&P (VIEW-ONLY)
Financial Resource Strain: Not on file   Food Insecurity: Not on file   Transportation Needs: Not on file   Physical Activity: Not on file   Stress: Not on file   Social Connections: Not on file   Intimate Partner Violence: Not on file   Housing Stability: Not on file       Family History   Problem Relation Age of Onset    Diabetes Neg Hx        REVIEW OF SYSTEMS:     Patient specifically denies fever/chills, chest pain, shortness of breath, new bowel or bladder complaints.  All other review of systems was negative.    Review of Systems - Documented reviewed    PHYSICAL EXAMINATION:      /78   Pulse 83   Temp 97.5 °F (36.4 °C) (Infrared)   Resp 18   Ht 5' 3\" (1.6 m)   Wt 120 lb (54.4 kg)   LMP 03/20/2018   SpO2 98%   BMI 21.26 kg/m²     General:      General appearance:  Pleasant and well-hydrated, in no distress and A & O x 3  Build:Normal Weight  Function: Rises from seated position easily    HEENT:    Head:normocephalic, atraumatic  Pupils:regular, round, equal  Sclera: icterus absent    Lungs:    Breathing:normal breathing pattern     CVS:     RRR    Abdomen:    Shape:non-distended and normal    Cervical spine:    Inspection:normal  Palpation:tenderness paravertebral muscles, tenderness trapezium, left, right : no  Range of motion:Normal flexion, extension, rotation bilaterally and is not painful.  Spurling's: negative bilaterally    Thoracic spine:     Spine inspection:normal   Palpation:No tenderness over the midline and paraspinal area, bilaterally  Range of motion:normal in flexion, extension rotation bilateral and is not painful.    Lumbar spine:    Spine inspection: Normal   Palpation: Tenderness paravertebral muscles Yes bilaterally  Range of motion: Decreased, flexion Decreased, Lateral bending, extension and rotation bilaterally reduced is painful.  Sacroiliac joint tenderness No bilaterally  HAJA test: negative bilaterally  Gaenslen's test:negative bilaterally   Piriformis

## 2023-03-10 NOTE — PROGRESS NOTES
Patient:  LALA Louise 1985  Date of Service:  3/10/23      Do you currently have any of the following:    Fever: No  Headache:  No  Cough: No  Shortness of breath: No  Exposed to anyone with these symptoms: No       Patient presents to [facility] with complaints of lower back/sacrum pain that started 19 years ago and has been getting worse. She states the pain began following herniating a disc while carrying an infant carrier; pt recently diagnosed with osteopenia    Pain is constant and is described as sharp, burning, numb, muscle cramping. She rates the pain as a 4/10 on her worst day , 10/10 on her best day, and a 6/10 on average on the VAS scale. Pain does radiate to right leg. She  has numbness, tingling of the right leg. Alleviating factors include: heat, ice, OTC NSAIDS, rest, and acetaminophen. Aggravating factors include:  cold, movement, standing. She states that the pain does keep her from sleeping at night. She took her last dose of Motrin and Tylenol today. She is  on NSAIDS and  is not on anticoagulation medications to include none and is managed by NA. Previous treatments: Physical Therapy, Epidural Steroid Injection, and medications. .      Personal Expectations from this treatment: return to work, increase activity, and decrease pain    Temp 97.5 °F (36.4 °C) (Infrared)   Resp 18   Ht 5' 3\" (1.6 m)   Wt 120 lb (54.4 kg)   LMP 2018   BMI 21.26 kg/m²     Patient's last menstrual period was 2018.

## 2023-03-10 NOTE — PROGRESS NOTES
Do you currently have any of the following:    Fever: {YES/NO:20444}  Headache:  {YES/NO:20444}  Cough: {YES/NO:20444}  Shortness of breath: {YES/NO:20444}  Exposed to anyone with these symptoms: {YES/NO:20444}                                                                                                                Ashley Smith presents to the [facility] on 3/10/2023. April is complaining of pain ***. The pain is {Desc; intermittent/persistent/constant:47297}. The pain is described as {PAIN ASSESSMENT:42822}. Pain is rated on her best day at a [address] [facility] 0-10:192975}, on her worst day at a [address] [facility] 0-10:865804}, and on average at a [address] [facility] 0-10:807713} on the VAS scale. She took her last dose of {Pain Meds:05620} ***. April {DOES/DOES MARY:95015} have issues with constipation. Any procedures since your last visit: {YES/NO:20444}, with *** % relief. She {IS/IS BQA:97518} on NSAIDS and  {IS/IS XEJ:16649} on anticoagulation medications to include {anticoagulants:45488} and is managed by ***. Pacemaker or defibrillator: {YES/NO:20444} Physician managing device is ***. Medication Contract and Consent for Opioid Use Documents Filed        No documents found                       LMP 03/20/2018      Patient's last menstrual period was 03/20/2018.

## 2023-03-10 NOTE — PROGRESS NOTES
[address]        [address] [facility], [address] [facility]      [phone]                  Consult Note      Patient:  LALA Louise 1985    Date of Service:  03/10/23     Requesting Physician:  JANEL Heath - *    Reason for Consult:      Patient presents with complaints of low back and right LE pain    HISTORY OF PRESENT ILLNESS:      Ms. Ashley Moore is a 43 y.o. female [facility] of acute exacerbation of low back pain and right lower extremity pain. Had history of on and off low back pain which was treated conservatively in the past.  On  she noted just sudden onset low back pain and right lower extremity pain after a bout of cough from bronchitis. She felt a pop in the back and subsequently noted severe pain. Needed treatment in ER. Treated conservatively. Severe pain causing functional limitations. Pain is constant and is described as aching and throbbing. Pain does radiate to right leg. She  has numbness, tingling, weakness of the right leg     Patient does not have bladder or bowel dysfunction. Alleviating factors include: rest. Aggravating factors include: movement, standing, lifting. Pain causes functional limitations/ limits Adl's : Yes    Nursing notes and details of the pain history reviewed. Please see intake notes for details. Prior treatment by Dr. Hanny Hernandez - had interventional had helped. Denies fever/ chills/ saddle anesthesia/ B/ B incontinence. Previous treatments:   Physical Therapy : yes, continues HEP.  CAN'T DO PT DUE TO SEVERE PAIN     Chiropractic treatment: yes- currently in chiropractic treatment- but difficult and increases pain     Medications: - NSAID's : yes             - Membrane stabilizers : no            - Opioids : no            - Adjuvants or Others : yes    Spine Surgeries: no    Interventional Pain procedures/ nerve blocks: yes, had helped. Anticoagulation medications: no and include     H/O Smoking: yes  H/O alcohol abuse : no  H/O Illicit drug use : CBD - daily. 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 Imaging:     CT Lumbar spine: 2/21/2023:  Impression   1. No acute osseous abnormality. 2. 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 MRI could be helpful   for further evaluation. Past Medical History:   Diagnosis Date    Chronic pain     Marijuana use     OCD (obsessive compulsive disorder)     Osteopenia        Past Surgical History:   Procedure Laterality Date    APPENDECTOMY      FOOT SURGERY      bilateral  multiple surgeries    NOSE SURGERY Bilateral 8/13/15    endoscopic septoplasty submucosal inferior resection turbinate    OTHER SURGICAL HISTORY N/A 07/19/2017    provocative discogram L4-5, L5-S1    OVARIAN CYST SURGERY      TUBAL LIGATION         Prior to Admission medications    Medication Sig Start Date End Date Taking?  Authorizing Provider   calcium carb-cholecalciferol 600-5 MG-MCG TABS tablet take 2 tablets by mouth once daily 2/25/23   Historical Provider, MD       Allergies   Allergen Reactions    Tape Jeaneen Eastern Tape]      Red rash       Social History     Socioeconomic History    Marital status:      Spouse name: Not on file    Number of children: Not on file    Years of education: Not on file    Highest education level: Not on file   Occupational History    Not on file   Tobacco Use    Smoking status: Every Day     Packs/day: 1.00     Years: 20.00     Pack years: 20.00     Types: Cigarettes    Smokeless tobacco: Never   Substance and Sexual Activity    Alcohol use: Not Currently     Comment: [id number]    Drug use: Yes     Types: Marijuana [place])     Comment: occassional    Sexual activity: Not on file   Other Topics Concern    Not on file   Social History Narrative    Not on file     Social Determinants of Health Financial Resource Strain: Not on file   Food Insecurity: Not on file   Transportation Needs: Not on file   Physical Activity: Not on file   Stress: Not on file   Social Connections: Not on file   Intimate Partner Violence: Not on file   Housing Stability: Not on file       Family History   Problem Relation Age of Onset    Diabetes Neg Hx        REVIEW OF SYSTEMS:     Patient specifically denies fever/chills, chest pain, shortness of breath, new bowel or bladder complaints. All other review of systems was negative. Review of Systems - Documented reviewed    PHYSICAL EXAMINATION:      /78   Pulse 83   Temp 97.5 °F (36.4 °C) (Infrared)   Resp 18   Ht 5' 3\" (1.6 m)   Wt 120 lb (54.4 kg)   LMP 03/20/2018   SpO2 98%   BMI 21.26 kg/m²     General:      General appearance:  Pleasant and well-hydrated, in no distress and A & O x 3  Build:Normal Weight  Function: Rises from seated position easily    HEENT:    Head:normocephalic, atraumatic  Pupils:regular, round, equal  Sclera: icterus absent    Lungs:    Breathing:normal breathing pattern     CVS:     RRR    Abdomen:    Shape:non-distended and normal    Cervical spine:    Inspection:normal  Palpation:tenderness paravertebral muscles, tenderness trapezium, left, right : no  Range of motion:Normal flexion, extension, rotation bilaterally and is not painful. Spurling's: negative bilaterally    Thoracic spine:     Spine inspection:normal   Palpation:No tenderness over the midline and paraspinal area, bilaterally  Range of motion:normal in flexion, extension rotation bilateral and is not painful. Lumbar spine:    Spine inspection: Normal   Palpation: Tenderness paravertebral muscles Yes bilaterally  Range of motion: Decreased, flexion Decreased, Lateral bending, extension and rotation bilaterally reduced is painful.   Sacroiliac joint tenderness No bilaterally  HAJA test: negative bilaterally  Gaenslen's test:negative bilaterally   Piriformis tenderness: negative bilaterally  SLR : positive right  Trochanteric bursa tenderness: negative bilaterally  CVA tenderness:No     Musculoskeletal:    Trigger points No     Extremities:    Tremors:None bilaterally upper and lower  Edema:no    Neurological:    Sensory: Normal to light touch - except for diminished sensation over the right lateral foot    Motor:   Right LE 4/5    Reflexes:    Right Quadriceps reflex 2+  Left Quadriceps reflex 2+  Right Achilles reflex 1+  Left Achilles reflex 1+    Dermatology:    Skin:no rashes or lesions noted    Assessment/Plan:     Diagnosis Orders   1. DDD (degenerative disc disease), lumbar        2. Lumbar radicular pain        3. Smoking        4. History of marijuana use          43 y.o. female with H/o low back and right LE pain. Acute exacerbation few weeks ago needing ER visit. Treated conservatively- analgesics/ muscle relaxants/ steroids/ NSAID's/ Chiro treatment- Exercise worsens the pain. CT of the lumbar spine shows L5-S1 right paracentral disc herniation. H/o smoking+    H/o THC use +    PLAN:    MRI of the lumbar spine on urgent basis considering she has+ SLR on the right, sensory changes over the right distal lower extremity and some weakness of the right lower extremity. She cannot do meaningful physical therapy at this point of time due to severe pain. We will start gabapentin titration up to 300 mg 3 times daily. Use instructions reviewed. We will give a short course of Percocet for as needed use for severe pain. Patient understands that we do not recommend chronic opioids and is only for short-term use until a definitive treatment plan is made. Flexeril for as needed use for muscle spasm. We will tentatively schedule for right L5-S1 Transforaminal epidural steroid injection on 20th. Hopefully she should have an MRI by then.     Discussed about red flags and symptoms and recommend to seek urgent medical care if she notices any.    Counseling : Patient encouraged to stay active and to continue Regular home exercise program as tolerated - stretching / strengthening. Smoking cessation counseling : yes. Treatment plan discussed with the patient including medication and procedure side effects. Controlled Substances Monitoring: OARRS reviewed. Cherelle Rascon MD    CC:    JANEL Mcneal - [facility],  [facility] [address]     Sandra Lawrence MD  [address] [place] [address]     NOTE: The above documentation was prepared using voice recognition software. Every attempt was made to ensure accuracy but there may be spelling, grammatical, and contextual errors.

## 2023-03-15 ENCOUNTER — HOSPITAL ENCOUNTER (OUTPATIENT)
Dept: MRI IMAGING | Age: 38
Discharge: HOME | End: 2023-03-17
Payer: COMMERCIAL

## 2023-03-15 ENCOUNTER — TELEPHONE (OUTPATIENT)
Dept: PAIN MANAGEMENT | Age: 38
End: 2023-03-15

## 2023-03-15 PROCEDURE — 72148 MRI LUMBAR SPINE W/O DYE: CPT

## 2023-03-15 NOTE — PROGRESS NOTES
[facility]  INSTRUCTIONS  . .......................................................................................................................................... [x] Parking the day of Surgery is located in the [facility].   Upon entering the door, make immediate right into the surgery reception room    [x]  Bring photo ID and insurance card     [x] You may have a light breakfast day of procedure    [x]  Wear loose comfortable clothing    [x]  Please follow instructions for medications as given per Dr's office    [x] You can expect a call the business day prior to procedure to notify you of your arrival time     [x] Please arrange for     []  Other instructions

## 2023-03-15 NOTE — TELEPHONE ENCOUNTER
Call to Ashley Espana that procedure was approved for 3/20/2023 and that Martasina should call her a few days before for the pre op call and between 2:00 PM and 4:00 PM  the business day before with the arrival time. Instructed April to hold ibuprofen for 24 hours, naprosyn for 4 days and any aspirin containing products or fish oil for 7 days. Instructed to call office back if any questions. April verbalized understanding.     Electronically signed by Zulma Siddiqui RN on 3/15/2023 at 3:05 PM

## 2023-03-20 ENCOUNTER — HOSPITAL ENCOUNTER (OUTPATIENT)
Age: 38
Setting detail: OUTPATIENT SURGERY
Discharge: HOME | End: 2023-03-20
Attending: ANESTHESIOLOGY | Admitting: ANESTHESIOLOGY
Payer: COMMERCIAL

## 2023-03-20 ENCOUNTER — HOSPITAL ENCOUNTER (OUTPATIENT)
Dept: GENERAL RADIOLOGY | Age: 38
Discharge: HOME | End: 2023-03-22
Attending: ANESTHESIOLOGY
Payer: COMMERCIAL

## 2023-03-20 VITALS
TEMPERATURE: 97.7 F | OXYGEN SATURATION: 99 % | SYSTOLIC BLOOD PRESSURE: 120 MMHG | BODY MASS INDEX: 22.15 KG/M2 | DIASTOLIC BLOOD PRESSURE: 67 MMHG | RESPIRATION RATE: 18 BRPM | HEART RATE: 66 BPM | HEIGHT: 63 IN | WEIGHT: 125 LBS

## 2023-03-20 DIAGNOSIS — R52 PAIN MANAGEMENT: ICD-10-CM

## 2023-03-20 PROCEDURE — 7100000010 HC PHASE II RECOVERY - FIRST 15 MIN: Performed by: ANESTHESIOLOGY

## 2023-03-20 PROCEDURE — 2709999900 HC NON-CHARGEABLE SUPPLY: Performed by: ANESTHESIOLOGY

## 2023-03-20 PROCEDURE — 3600000002 HC SURGERY LEVEL 2 BASE: Performed by: ANESTHESIOLOGY

## 2023-03-20 PROCEDURE — 3209999900 FLUORO FOR SURGICAL PROCEDURES

## 2023-03-20 PROCEDURE — 7100000011 HC PHASE II RECOVERY - ADDTL 15 MIN: Performed by: ANESTHESIOLOGY

## 2023-03-20 PROCEDURE — 2500000003 HC RX 250 WO HCPCS: Performed by: ANESTHESIOLOGY

## 2023-03-20 PROCEDURE — 6360000004 HC RX CONTRAST MEDICATION: Performed by: ANESTHESIOLOGY

## 2023-03-20 PROCEDURE — 64484 NJX AA&/STRD TFRM EPI L/S EA: CPT | Performed by: ANESTHESIOLOGY

## 2023-03-20 PROCEDURE — 64483 NJX AA&/STRD TFRM EPI L/S 1: CPT | Performed by: ANESTHESIOLOGY

## 2023-03-20 PROCEDURE — 3600000012 HC SURGERY LEVEL 2 ADDTL 15MIN: Performed by: ANESTHESIOLOGY

## 2023-03-20 PROCEDURE — 6360000002 HC RX W HCPCS: Performed by: ANESTHESIOLOGY

## 2023-03-20 RX ORDER — LIDOCAINE HYDROCHLORIDE 5 MG/ML
INJECTION, SOLUTION INFILTRATION; INTRAVENOUS PRN
Status: DISCONTINUED | OUTPATIENT
Start: 2023-03-20 | End: 2023-03-20 | Stop reason: ALTCHOICE

## 2023-03-20 RX ORDER — SODIUM CHLORIDE 9 MG/ML
INJECTION, SOLUTION INTRAVENOUS PRN
Status: DISCONTINUED | OUTPATIENT
Start: 2023-03-20 | End: 2023-03-20 | Stop reason: HOSPADM

## 2023-03-20 RX ORDER — SODIUM CHLORIDE 0.9 % (FLUSH) 0.9 %
5-40 SYRINGE (ML) INJECTION PRN
Status: DISCONTINUED | OUTPATIENT
Start: 2023-03-20 | End: 2023-03-20 | Stop reason: HOSPADM

## 2023-03-20 RX ORDER — SODIUM CHLORIDE 0.9 % (FLUSH) 0.9 %
5-40 SYRINGE (ML) INJECTION EVERY 12 HOURS SCHEDULED
Status: DISCONTINUED | OUTPATIENT
Start: 2023-03-20 | End: 2023-03-20 | Stop reason: HOSPADM

## 2023-03-20 RX ORDER — METHYLPREDNISOLONE ACETATE 40 MG/ML
INJECTION, SUSPENSION INTRA-ARTICULAR; INTRALESIONAL; INTRAMUSCULAR; SOFT TISSUE PRN
Status: DISCONTINUED | OUTPATIENT
Start: 2023-03-20 | End: 2023-03-20 | Stop reason: ALTCHOICE

## 2023-03-20 ASSESSMENT — PAIN DESCRIPTION - DESCRIPTORS: DESCRIPTORS: DISCOMFORT

## 2023-03-20 ASSESSMENT — PAIN - FUNCTIONAL ASSESSMENT: PAIN_FUNCTIONAL_ASSESSMENT: 0-10

## 2023-03-20 NOTE — DISCHARGE INSTRUCTIONS
[employer]  Dr. Pranav Briscoe/Radiofrequency  Home Going Instructions    1-Go home, rest for the remainder of the day  2-Please do not lift over 20 pounds the day of the injection  3-If you received sedation No: alcohol, driving, operating lawn mowers, plows, tractors or other dangerous equipment until next morning. Do not make important decisions or sign legal documents for 24 hours. You may experience light headedness, dizziness, nausea or sleepiness after sedation. Do not stay alone. A responsible adult must be with you for 24 hours. You could be nauseated from the medications you have received. Your IV site may be sore and bruised. 4-No dietary restrictions     5-Resume all medications the same day, blood thinners to be resumed 24 hours after injection if you were instructed to stop any. 6-Keep the surgical site clean and dry, you may shower the next morning and remove the      dressing. 7- No sitz baths, tub baths or hot tubs/swimming for 24 hours. 8- If you have any pain at the injection site(s), application of an ice pack to the area should be       helpful, 20 minutes on/20 minutes off for next 48 hours. 9- Call [facility] immediately at if you develop.   Fever greater than 100.4 F  Have bleeding or drainage from the puncture site  Have progressive Leg/arm numbness and or weakness  Loss of control of bowel and or bladder (wet/soil yourself)  Severe headache with inability to lift head  10-You may return to work the next day

## 2023-03-20 NOTE — INTERVAL H&P NOTE
Update History & Physical    The patient's History and Physical of March 10, 2023 was reviewed with the patient and I examined the patient. There was no change. The surgical site was confirmed by the patient and me. Plan: The risks, benefits, expected outcome, and alternative to the recommended procedure have been discussed with the patient. Patient understands and wants to proceed with the procedure.      Electronically signed by Taylor Lorenzo MD on 3/20/2023

## 2023-03-20 NOTE — OP NOTE
Operative Note      Patient:  Jovi  YOB: 1985  MRN: [id number]    Date of Procedure: 3/20/2023    Pre-Op Diagnosis: Lumbar radiculopathy [M54.16]    Post-Op Diagnosis: Same       Procedure(s):  LUMBAR TRANSFORAMINAL EPIDURAL STEROID INJECTION RIGHT L5 AND S1 UNDER FLUOROSCOPIC GUIDANCE    Surgeon(s):  Taylor Lorenzo MD    Assistant:   * No surgical staff found *    Anesthesia: Local    Estimated Blood Loss (mL): Minimal    Complications: None    Specimens:   * No specimens in log *    Implants:  * No implants in log *      Drains: * No LDAs found *    Findings: good needle placement    Detailed Description of Procedure:   3/20/2023    Patient:  Jovi  :  1985  Age:  43 y.o. Sex:  female     PRE-OPERATIVE DIAGNOSIS: Lumbar disc displacement, lumbar neural foraminal stenosis, lumbar radiculopathy. POST-OPERATIVE DIAGNOSIS: Same. PROCEDURE: Right Transforaminal epidural steroid injection under fluoroscopic guidance at foraminal level L5 & S1.    SURGEON: Taylor Lorenzo MD    ANESTHESIA: Local    ESTIMATED BLOOD LOSS: None.  ______________________________________________________________________  BRIEF HISTORY:  Niki Collier comes in today for the first Right transforaminal epidural steroid injection under fluoroscopic guidance at foraminal level L5 & S1 . The potential complications of this procedure were discussed with her again today. She has elected to undergo the aforementioned procedure.  complete History & Physical examination were reviewed in depth, a copy of which is in the chart. DESCRIPTION OF PROCEDURE:    After confirming written and informed consent, a time-out was performed and  name and date of birth, the procedure to be performed as well as the plan for the location of the needle insertion were confirmed. The patient was brought into the procedure room and placed in the prone position on the fluoroscopy table.  Standard

## 2023-04-04 ENCOUNTER — OFFICE VISIT (OUTPATIENT)
Dept: PAIN MANAGEMENT | Age: 38
End: 2023-04-04
Payer: COMMERCIAL

## 2023-04-04 VITALS
DIASTOLIC BLOOD PRESSURE: 68 MMHG | SYSTOLIC BLOOD PRESSURE: 124 MMHG | OXYGEN SATURATION: 96 % | BODY MASS INDEX: 22.15 KG/M2 | WEIGHT: 125 LBS | HEIGHT: 63 IN | HEART RATE: 67 BPM | TEMPERATURE: 97.5 F

## 2023-04-04 DIAGNOSIS — M51.26 LUMBAR DISC HERNIATION: ICD-10-CM

## 2023-04-04 DIAGNOSIS — M54.16 LUMBAR RADICULOPATHY: Primary | ICD-10-CM

## 2023-04-04 PROCEDURE — 99213 OFFICE O/P EST LOW 20 MIN: CPT | Performed by: ANESTHESIOLOGY

## 2023-04-04 PROCEDURE — 99214 OFFICE O/P EST MOD 30 MIN: CPT | Performed by: ANESTHESIOLOGY

## 2023-04-04 RX ORDER — CYCLOBENZAPRINE HCL 5 MG
5 TABLET ORAL 2 TIMES DAILY PRN
Qty: 30 TABLET | Refills: 1 | Status: SHIPPED | OUTPATIENT
Start: 2023-04-04 | End: 2023-05-04

## 2023-04-04 RX ORDER — OXYCODONE HYDROCHLORIDE AND ACETAMINOPHEN 5; 325 MG/1; MG/1
1 TABLET ORAL EVERY 8 HOURS PRN
Qty: 21 TABLET | Refills: 0 | Status: SHIPPED | OUTPATIENT
Start: 2023-04-04 | End: 2023-04-11

## 2023-04-04 NOTE — PROGRESS NOTES
Ashley Tovar presents to the [facility] on 4/4/2023. April is complaining of pain in back and right leg. The pain is constant. The pain is described as aching and numb. Pain is rated on her best day at a 3, on her worst day at a 6, and on average at a 4 on the VAS scale. She took her last dose of Neurontin yesterday. April does not have issues with constipation. Any procedures since your last visit: Yes, with 20 % relief. She is not on NSAIDS and  is not on anticoagulation medications to include none and is managed by none. Pacemaker or defibrillator: No.    Medication Contract and Consent for Opioid Use Documents Filed        No documents found                       LMP 03/20/2018      Patient's last menstrual period was 03/20/2018.
Treatment plan discussed with the patient including medication and procedure side effects. Controlled Substances Monitoring: OARRS reviewed. > 30 mins spent for the visit including the time taken to discuss the treatment plan, counseling coordination of care.       Cathy Barrera MD

## 2023-04-06 ENCOUNTER — OFFICE VISIT (OUTPATIENT)
Dept: NEUROSURGERY | Age: 38
End: 2023-04-06
Payer: COMMERCIAL

## 2023-04-06 VITALS
SYSTOLIC BLOOD PRESSURE: 105 MMHG | RESPIRATION RATE: 18 BRPM | HEART RATE: 66 BPM | HEIGHT: 63 IN | WEIGHT: 125 LBS | TEMPERATURE: 98.1 F | BODY MASS INDEX: 22.15 KG/M2 | OXYGEN SATURATION: 98 % | DIASTOLIC BLOOD PRESSURE: 63 MMHG

## 2023-04-06 DIAGNOSIS — M54.41 ACUTE MIDLINE LOW BACK PAIN WITH RIGHT-SIDED SCIATICA: Primary | ICD-10-CM

## 2023-04-06 PROCEDURE — 99204 OFFICE O/P NEW MOD 45 MIN: CPT | Performed by: NEUROLOGICAL SURGERY

## 2023-04-06 PROCEDURE — 99212 OFFICE O/P EST SF 10 MIN: CPT

## 2023-04-06 ASSESSMENT — ENCOUNTER SYMPTOMS
ALLERGIC/IMMUNOLOGIC NEGATIVE: 1
EYES NEGATIVE: 1
GASTROINTESTINAL NEGATIVE: 1
RESPIRATORY NEGATIVE: 1
BACK PAIN: 1

## 2023-04-06 NOTE — PROGRESS NOTES
distress. Appearance: Normal appearance. She is normal weight. She is not ill-appearing, toxic-appearing or diaphoretic. HENT:      Head: Normocephalic and atraumatic. Nose: Nose normal. No congestion. Mouth/Throat:      Mouth: Mucous membranes are moist.      Pharynx: Oropharynx is clear. No oropharyngeal exudate or posterior oropharyngeal erythema. Eyes:      General: No visual field deficit or scleral icterus. Right eye: No discharge. Left eye: No discharge. Extraocular Movements: Extraocular movements intact. Conjunctiva/sclera: Conjunctivae normal.      Pupils: Pupils are equal, round, and reactive to light. Pulmonary:      Effort: Pulmonary effort is normal. No respiratory distress. Abdominal:      General: Abdomen is flat. There is no distension. Skin:     General: Skin is warm and dry. Capillary Refill: Capillary refill takes less than 2 seconds. Coloration: Skin is not jaundiced or pale. Findings: No bruising, erythema, lesion or rash. Neurological:      Mental Status: She is alert and oriented to person, place, and time. Mental status is at baseline. Cranial Nerves: No cranial nerve deficit, dysarthria or facial asymmetry. Sensory: Sensation is intact. No sensory deficit. Motor: Weakness present. No tremor, atrophy, abnormal muscle tone, seizure activity or pronator drift. Coordination: Coordination is intact. Romberg sign negative. Coordination normal. Finger-Nose-Finger Test normal.      Gait: Gait normal.      Deep Tendon Reflexes: Reflexes normal. Babinski sign absent on the right side. Babinski sign absent on the left side. Reflex Scores:       Tricep reflexes are 2+ on the right side and 2+ on the left side. Bicep reflexes are 2+ on the right side and 2+ on the left side. Brachioradialis reflexes are 2+ on the right side and 2+ on the left side.        Patellar reflexes are 2+ on the right side and

## 2023-04-10 ENCOUNTER — PREP FOR PROCEDURE (OUTPATIENT)
Dept: NEUROSURGERY | Age: 38
End: 2023-04-10

## 2023-04-10 DIAGNOSIS — Z01.818 PRE-OP TESTING: Primary | ICD-10-CM

## 2023-04-10 RX ORDER — SODIUM CHLORIDE 0.9 % (FLUSH) 0.9 %
5-40 SYRINGE (ML) INJECTION EVERY 12 HOURS SCHEDULED
Status: CANCELLED | OUTPATIENT
Start: 2023-04-10

## 2023-04-10 RX ORDER — SODIUM CHLORIDE 9 MG/ML
INJECTION, SOLUTION INTRAVENOUS CONTINUOUS
Status: CANCELLED | OUTPATIENT
Start: 2023-04-10

## 2023-04-10 RX ORDER — SODIUM CHLORIDE 9 MG/ML
INJECTION, SOLUTION INTRAVENOUS PRN
Status: CANCELLED | OUTPATIENT
Start: 2023-04-10

## 2023-04-10 RX ORDER — SODIUM CHLORIDE 0.9 % (FLUSH) 0.9 %
5-40 SYRINGE (ML) INJECTION PRN
Status: CANCELLED | OUTPATIENT
Start: 2023-04-10

## 2023-04-14 VITALS — HEIGHT: 63 IN | WEIGHT: 120 LBS | BODY MASS INDEX: 21.26 KG/M2

## 2023-04-14 RX ORDER — OXYCODONE HYDROCHLORIDE AND ACETAMINOPHEN 5; 325 MG/1; MG/1
1 TABLET ORAL EVERY 8 HOURS PRN
Status: ON HOLD | COMMUNITY
End: 2023-04-28 | Stop reason: SDUPTHER

## 2023-04-21 ENCOUNTER — HOSPITAL ENCOUNTER (OUTPATIENT)
Dept: PREADMISSION TESTING | Age: 38
Discharge: HOME | End: 2023-04-21

## 2023-04-26 ENCOUNTER — HOSPITAL ENCOUNTER (OUTPATIENT)
Dept: GENERAL RADIOLOGY | Age: 38
Discharge: HOME | End: 2023-04-28
Payer: COMMERCIAL

## 2023-04-26 ENCOUNTER — HOSPITAL ENCOUNTER (OUTPATIENT)
Dept: PREADMISSION TESTING | Age: 38
Setting detail: OUTPATIENT SURGERY
Discharge: HOME | End: 2023-04-26
Payer: COMMERCIAL

## 2023-04-26 VITALS
TEMPERATURE: 98.6 F | DIASTOLIC BLOOD PRESSURE: 67 MMHG | HEART RATE: 74 BPM | RESPIRATION RATE: 18 BRPM | OXYGEN SATURATION: 98 % | SYSTOLIC BLOOD PRESSURE: 146 MMHG

## 2023-04-26 DIAGNOSIS — Z01.818 PRE-OP TESTING: ICD-10-CM

## 2023-04-26 LAB
ABO + RH BLD: NORMAL
ANION GAP SERPL CALCULATED.3IONS-SCNC: 10 MMOL/L (ref 7–16)
BASOPHILS # BLD: 0.05 E9/L (ref 0–0.2)
BASOPHILS NFR BLD: 0.8 % (ref 0–2)
BILIRUB UR QL STRIP: NEGATIVE
BLD GP AB SCN SERPL QL: NORMAL
BUN SERPL-MCNC: 11 MG/DL (ref 6–20)
CALCIUM SERPL-MCNC: 9.8 MG/DL (ref 8.6–10.2)
CHLORIDE SERPL-SCNC: 100 MMOL/L (ref 98–107)
CLARITY UR: CLEAR
CO2 SERPL-SCNC: 30 MMOL/L (ref 22–29)
COLOR UR: YELLOW
CREAT SERPL-MCNC: 0.7 MG/DL (ref 0.5–1)
EKG ATRIAL RATE: 72 BPM
EKG P AXIS: 74 DEGREES
EKG P-R INTERVAL: 130 MS
EKG Q-T INTERVAL: 370 MS
EKG QRS DURATION: 82 MS
EKG QTC CALCULATION (BAZETT): 405 MS
EKG R AXIS: 81 DEGREES
EKG T AXIS: 56 DEGREES
EKG VENTRICULAR RATE: 72 BPM
EOSINOPHIL # BLD: 0.19 E9/L (ref 0.05–0.5)
EOSINOPHIL NFR BLD: 3.1 % (ref 0–6)
ERYTHROCYTE [DISTWIDTH] IN BLOOD BY AUTOMATED COUNT: 13.2 FL (ref 11.5–15)
GLUCOSE SERPL-MCNC: 86 MG/DL (ref 74–99)
GLUCOSE UR STRIP-MCNC: NEGATIVE MG/DL
HCT VFR BLD AUTO: 50.5 % (ref 34–48)
HGB BLD-MCNC: 16.2 G/DL (ref 11.5–15.5)
HGB UR QL STRIP: NEGATIVE
IMM GRANULOCYTES # BLD: 0.02 E9/L
IMM GRANULOCYTES NFR BLD: 0.3 % (ref 0–5)
INR BLD: 1
KETONES UR STRIP-MCNC: NEGATIVE MG/DL
LEUKOCYTE ESTERASE UR QL STRIP: NEGATIVE
LYMPHOCYTES # BLD: 1.46 E9/L (ref 1.5–4)
LYMPHOCYTES NFR BLD: 23.8 % (ref 20–42)
MCH RBC QN AUTO: 32.1 PG (ref 26–35)
MCHC RBC AUTO-ENTMCNC: 32.1 % (ref 32–34.5)
MCV RBC AUTO: 100 FL (ref 80–99.9)
MONOCYTES # BLD: 0.56 E9/L (ref 0.1–0.95)
MONOCYTES NFR BLD: 9.1 % (ref 2–12)
NEUTROPHILS # BLD: 3.86 E9/L (ref 1.8–7.3)
NEUTS SEG NFR BLD: 62.9 % (ref 43–80)
NITRITE UR QL STRIP: NEGATIVE
PH UR STRIP: 7.5 [PH] (ref 5–9)
PLATELET # BLD AUTO: 294 E9/L (ref 130–450)
PMV BLD AUTO: 10.1 FL (ref 7–12)
POTASSIUM SERPL-SCNC: 4.8 MMOL/L (ref 3.5–5)
PROT UR STRIP-MCNC: NEGATIVE MG/DL
PROTHROMBIN TIME: 10.6 SEC (ref 9.3–12.4)
RBC # BLD AUTO: 5.05 E12/L (ref 3.5–5.5)
SODIUM SERPL-SCNC: 140 MMOL/L (ref 132–146)
SP GR UR STRIP: 1.01 (ref 1–1.03)
UROBILINOGEN UR STRIP-ACNC: 0.2 E.U./DL
WBC # BLD: 6.1 E9/L (ref 4.5–11.5)

## 2023-04-26 PROCEDURE — 36415 COLL VENOUS BLD VENIPUNCTURE: CPT

## 2023-04-26 PROCEDURE — 85610 PROTHROMBIN TIME: CPT

## 2023-04-26 PROCEDURE — 71046 X-RAY EXAM CHEST 2 VIEWS: CPT

## 2023-04-26 PROCEDURE — 87088 URINE BACTERIA CULTURE: CPT

## 2023-04-26 PROCEDURE — 80048 BASIC METABOLIC PNL TOTAL CA: CPT

## 2023-04-26 PROCEDURE — 81003 URINALYSIS AUTO W/O SCOPE: CPT

## 2023-04-26 PROCEDURE — 85025 COMPLETE CBC W/AUTO DIFF WBC: CPT

## 2023-04-26 PROCEDURE — 86850 RBC ANTIBODY SCREEN: CPT

## 2023-04-26 PROCEDURE — 86901 BLOOD TYPING SEROLOGIC RH(D): CPT

## 2023-04-26 PROCEDURE — 93005 ELECTROCARDIOGRAM TRACING: CPT

## 2023-04-26 PROCEDURE — 86900 BLOOD TYPING SEROLOGIC ABO: CPT

## 2023-04-27 ENCOUNTER — HOSPITAL ENCOUNTER (OUTPATIENT)
Age: 38
Setting detail: OUTPATIENT SURGERY
Discharge: HOME | End: 2023-04-28
Attending: NEUROLOGICAL SURGERY | Admitting: NEUROLOGICAL SURGERY
Payer: COMMERCIAL

## 2023-04-27 DIAGNOSIS — M51.26 DISPLACEMENT OF LUMBAR INTERVERTEBRAL DISC WITHOUT MYELOPATHY: ICD-10-CM

## 2023-04-27 NOTE — H&P
Ashley Espana (:  1985) is a 43 y.o. female,New patient, here for evaluation of the following chief complaint(s):  Follow-up (Been having issues with herniated disc for over 20 years  the last therapy was at [facility] pain management last injection was [facility] a week ago currently in pain management up until yesterday and chiropractor  )        ASSESSMENT/PLAN:  1. Acute midline low back pain with right-sided sciatica  43year old lady who presents with back and right leg pain. Her MRI shows a right L5-S1 herniated disk. The pain is affecting her activities of daily living such as self care. She has failed physical therapy and epidural steroid injections. I am recommending a right L5-S1 hemilaminotomy and diskectomy Risks, benefits and alternatives of surgery have been discussed and she wishes to proceed. No follow-ups on file. Subjective   SUBJECTIVE/OBJECTIVE:  HPI  43year old lady who presents with back pain. The pain radiates into her right leg. She has numbness, tingling and weakness in her right leg. The pain is described as sharp, stabbing and aching and burning. It is made worse with activity and better with rest.  She denies loss of control of bowel or bladder function. The pain does not change with time of day. The pain is rated as 7/10. She has tried PT and epidurals.     Past Medical History:   Diagnosis Date    Chronic pain     Lumbar pain     OCD (obsessive compulsive disorder)     Osteopenia      Past Surgical History:   Procedure Laterality Date    APPENDECTOMY      FOOT SURGERY Bilateral     bilateral  multiple surgeries    HYSTERECTOMY (CERVIX STATUS UNKNOWN)      NOSE SURGERY Bilateral 2015    endoscopic septoplasty submucosal inferior resection turbinate    OTHER SURGICAL HISTORY N/A 2017    provocative discogram L4-5, L5-S1    OVARIAN CYST SURGERY      PAIN MANAGEMENT PROCEDURE Right 3/20/2023    LUMBAR TRANSFORAMINAL EPIDURAL STEROID INJECTION RIGHT L5 AND S1

## 2023-04-28 ENCOUNTER — ANESTHESIA EVENT (OUTPATIENT)
Dept: OPERATING ROOM | Age: 38
End: 2023-04-28
Payer: COMMERCIAL

## 2023-04-28 ENCOUNTER — APPOINTMENT (OUTPATIENT)
Dept: GENERAL RADIOLOGY | Age: 38
End: 2023-04-28
Attending: NEUROLOGICAL SURGERY
Payer: COMMERCIAL

## 2023-04-28 ENCOUNTER — ANESTHESIA (OUTPATIENT)
Dept: OPERATING ROOM | Age: 38
End: 2023-04-28
Payer: COMMERCIAL

## 2023-04-28 VITALS
BODY MASS INDEX: 21.44 KG/M2 | WEIGHT: 121 LBS | HEART RATE: 68 BPM | HEIGHT: 63 IN | OXYGEN SATURATION: 97 % | SYSTOLIC BLOOD PRESSURE: 122 MMHG | DIASTOLIC BLOOD PRESSURE: 44 MMHG | TEMPERATURE: 97 F | RESPIRATION RATE: 15 BRPM

## 2023-04-28 PROBLEM — M51.26 DISPLACEMENT OF LUMBAR INTERVERTEBRAL DISC WITHOUT MYELOPATHY: Status: ACTIVE | Noted: 2017-06-28

## 2023-04-28 LAB — BACTERIA UR CULT: NORMAL

## 2023-04-28 PROCEDURE — 6360000002 HC RX W HCPCS: Performed by: NEUROLOGICAL SURGERY

## 2023-04-28 PROCEDURE — 2580000003 HC RX 258: Performed by: STUDENT IN AN ORGANIZED HEALTH CARE EDUCATION/TRAINING PROGRAM

## 2023-04-28 PROCEDURE — 88304 TISSUE EXAM BY PATHOLOGIST: CPT

## 2023-04-28 PROCEDURE — 6370000000 HC RX 637 (ALT 250 FOR IP): Performed by: NEUROLOGICAL SURGERY

## 2023-04-28 PROCEDURE — 3600000014 HC SURGERY LEVEL 4 ADDTL 15MIN: Performed by: NEUROLOGICAL SURGERY

## 2023-04-28 PROCEDURE — 7100000001 HC PACU RECOVERY - ADDTL 15 MIN: Performed by: NEUROLOGICAL SURGERY

## 2023-04-28 PROCEDURE — 2580000003 HC RX 258: Performed by: NEUROLOGICAL SURGERY

## 2023-04-28 PROCEDURE — A4217 STERILE WATER/SALINE, 500 ML: HCPCS | Performed by: NEUROLOGICAL SURGERY

## 2023-04-28 PROCEDURE — 3700000001 HC ADD 15 MINUTES (ANESTHESIA): Performed by: NEUROLOGICAL SURGERY

## 2023-04-28 PROCEDURE — 88311 DECALCIFY TISSUE: CPT

## 2023-04-28 PROCEDURE — 7100000010 HC PHASE II RECOVERY - FIRST 15 MIN: Performed by: NEUROLOGICAL SURGERY

## 2023-04-28 PROCEDURE — 3209999900 FLUORO FOR SURGICAL PROCEDURES

## 2023-04-28 PROCEDURE — 6360000002 HC RX W HCPCS: Performed by: STUDENT IN AN ORGANIZED HEALTH CARE EDUCATION/TRAINING PROGRAM

## 2023-04-28 PROCEDURE — 7100000000 HC PACU RECOVERY - FIRST 15 MIN: Performed by: NEUROLOGICAL SURGERY

## 2023-04-28 PROCEDURE — 2500000003 HC RX 250 WO HCPCS: Performed by: ANESTHESIOLOGY

## 2023-04-28 PROCEDURE — 3700000000 HC ANESTHESIA ATTENDED CARE: Performed by: NEUROLOGICAL SURGERY

## 2023-04-28 PROCEDURE — 2720000010 HC SURG SUPPLY STERILE: Performed by: NEUROLOGICAL SURGERY

## 2023-04-28 PROCEDURE — 7100000011 HC PHASE II RECOVERY - ADDTL 15 MIN: Performed by: NEUROLOGICAL SURGERY

## 2023-04-28 PROCEDURE — 2709999900 HC NON-CHARGEABLE SUPPLY: Performed by: NEUROLOGICAL SURGERY

## 2023-04-28 PROCEDURE — 2500000003 HC RX 250 WO HCPCS: Performed by: NEUROLOGICAL SURGERY

## 2023-04-28 PROCEDURE — 3600000004 HC SURGERY LEVEL 4 BASE: Performed by: NEUROLOGICAL SURGERY

## 2023-04-28 RX ORDER — CYCLOBENZAPRINE HCL 5 MG
5 TABLET ORAL 3 TIMES DAILY PRN
Qty: 30 TABLET | Refills: 1 | Status: SHIPPED | OUTPATIENT
Start: 2023-04-28 | End: 2023-05-28

## 2023-04-28 RX ORDER — HYDROMORPHONE HYDROCHLORIDE 1 MG/ML
0.25 INJECTION, SOLUTION INTRAMUSCULAR; INTRAVENOUS; SUBCUTANEOUS EVERY 5 MIN PRN
Status: DISCONTINUED | OUTPATIENT
Start: 2023-04-28 | End: 2023-04-28 | Stop reason: HOSPADM

## 2023-04-28 RX ORDER — LIDOCAINE HYDROCHLORIDE 20 MG/ML
INJECTION, SOLUTION INTRAVENOUS PRN
Status: DISCONTINUED | OUTPATIENT
Start: 2023-04-28 | End: 2023-04-28 | Stop reason: SDUPTHER

## 2023-04-28 RX ORDER — SODIUM CHLORIDE 9 MG/ML
INJECTION, SOLUTION INTRAVENOUS CONTINUOUS
Status: DISCONTINUED | OUTPATIENT
Start: 2023-04-28 | End: 2023-04-28 | Stop reason: HOSPADM

## 2023-04-28 RX ORDER — GLYCOPYRROLATE 0.2 MG/ML
INJECTION INTRAMUSCULAR; INTRAVENOUS PRN
Status: DISCONTINUED | OUTPATIENT
Start: 2023-04-28 | End: 2023-04-28 | Stop reason: SDUPTHER

## 2023-04-28 RX ORDER — SODIUM CHLORIDE 0.9 % (FLUSH) 0.9 %
5-40 SYRINGE (ML) INJECTION EVERY 12 HOURS SCHEDULED
Status: DISCONTINUED | OUTPATIENT
Start: 2023-04-28 | End: 2023-04-28 | Stop reason: HOSPADM

## 2023-04-28 RX ORDER — OXYCODONE HYDROCHLORIDE AND ACETAMINOPHEN 5; 325 MG/1; MG/1
1 TABLET ORAL EVERY 4 HOURS PRN
Qty: 42 TABLET | Refills: 0 | Status: SHIPPED | OUTPATIENT
Start: 2023-04-28 | End: 2023-05-05

## 2023-04-28 RX ORDER — SODIUM CHLORIDE 9 MG/ML
INJECTION, SOLUTION INTRAVENOUS PRN
Status: DISCONTINUED | OUTPATIENT
Start: 2023-04-28 | End: 2023-04-28 | Stop reason: HOSPADM

## 2023-04-28 RX ORDER — SODIUM CHLORIDE 0.9 % (FLUSH) 0.9 %
5-40 SYRINGE (ML) INJECTION PRN
Status: DISCONTINUED | OUTPATIENT
Start: 2023-04-28 | End: 2023-04-28 | Stop reason: HOSPADM

## 2023-04-28 RX ORDER — DIPHENHYDRAMINE HYDROCHLORIDE 50 MG/ML
12.5 INJECTION INTRAMUSCULAR; INTRAVENOUS
Status: DISCONTINUED | OUTPATIENT
Start: 2023-04-28 | End: 2023-04-28 | Stop reason: HOSPADM

## 2023-04-28 RX ORDER — PROPOFOL 10 MG/ML
INJECTION, EMULSION INTRAVENOUS PRN
Status: DISCONTINUED | OUTPATIENT
Start: 2023-04-28 | End: 2023-04-28 | Stop reason: SDUPTHER

## 2023-04-28 RX ORDER — HYDROMORPHONE HYDROCHLORIDE 1 MG/ML
0.5 INJECTION, SOLUTION INTRAMUSCULAR; INTRAVENOUS; SUBCUTANEOUS EVERY 5 MIN PRN
Status: COMPLETED | OUTPATIENT
Start: 2023-04-28 | End: 2023-04-28

## 2023-04-28 RX ORDER — FENTANYL CITRATE 50 UG/ML
INJECTION, SOLUTION INTRAMUSCULAR; INTRAVENOUS PRN
Status: DISCONTINUED | OUTPATIENT
Start: 2023-04-28 | End: 2023-04-28 | Stop reason: SDUPTHER

## 2023-04-28 RX ORDER — ONDANSETRON 2 MG/ML
INJECTION INTRAMUSCULAR; INTRAVENOUS PRN
Status: DISCONTINUED | OUTPATIENT
Start: 2023-04-28 | End: 2023-04-28 | Stop reason: SDUPTHER

## 2023-04-28 RX ORDER — BUPIVACAINE HYDROCHLORIDE 2.5 MG/ML
INJECTION, SOLUTION EPIDURAL; INFILTRATION; INTRACAUDAL PRN
Status: DISCONTINUED | OUTPATIENT
Start: 2023-04-28 | End: 2023-04-28 | Stop reason: ALTCHOICE

## 2023-04-28 RX ORDER — VANCOMYCIN HYDROCHLORIDE 500 MG/10ML
INJECTION, POWDER, LYOPHILIZED, FOR SOLUTION INTRAVENOUS PRN
Status: DISCONTINUED | OUTPATIENT
Start: 2023-04-28 | End: 2023-04-28 | Stop reason: ALTCHOICE

## 2023-04-28 RX ORDER — MEPERIDINE HYDROCHLORIDE 25 MG/ML
INJECTION INTRAMUSCULAR; INTRAVENOUS; SUBCUTANEOUS PRN
Status: DISCONTINUED | OUTPATIENT
Start: 2023-04-28 | End: 2023-04-28 | Stop reason: SDUPTHER

## 2023-04-28 RX ORDER — MEPERIDINE HYDROCHLORIDE 25 MG/ML
12.5 INJECTION INTRAMUSCULAR; INTRAVENOUS; SUBCUTANEOUS EVERY 5 MIN PRN
Status: DISCONTINUED | OUTPATIENT
Start: 2023-04-28 | End: 2023-04-28 | Stop reason: HOSPADM

## 2023-04-28 RX ORDER — ROCURONIUM BROMIDE 10 MG/ML
INJECTION, SOLUTION INTRAVENOUS PRN
Status: DISCONTINUED | OUTPATIENT
Start: 2023-04-28 | End: 2023-04-28 | Stop reason: SDUPTHER

## 2023-04-28 RX ORDER — LIDOCAINE HYDROCHLORIDE AND EPINEPHRINE 5; 5 MG/ML; UG/ML
INJECTION, SOLUTION INFILTRATION; PERINEURAL PRN
Status: DISCONTINUED | OUTPATIENT
Start: 2023-04-28 | End: 2023-04-28 | Stop reason: ALTCHOICE

## 2023-04-28 RX ORDER — DEXAMETHASONE SODIUM PHOSPHATE 10 MG/ML
INJECTION INTRAMUSCULAR; INTRAVENOUS PRN
Status: DISCONTINUED | OUTPATIENT
Start: 2023-04-28 | End: 2023-04-28 | Stop reason: SDUPTHER

## 2023-04-28 RX ORDER — MIDAZOLAM HYDROCHLORIDE 1 MG/ML
INJECTION INTRAMUSCULAR; INTRAVENOUS PRN
Status: DISCONTINUED | OUTPATIENT
Start: 2023-04-28 | End: 2023-04-28 | Stop reason: SDUPTHER

## 2023-04-28 RX ORDER — NEOSTIGMINE METHYLSULFATE 1 MG/ML
INJECTION, SOLUTION INTRAVENOUS PRN
Status: DISCONTINUED | OUTPATIENT
Start: 2023-04-28 | End: 2023-04-28 | Stop reason: SDUPTHER

## 2023-04-28 RX ORDER — MEPERIDINE HYDROCHLORIDE 25 MG/ML
INJECTION INTRAMUSCULAR; INTRAVENOUS; SUBCUTANEOUS
Status: COMPLETED
Start: 2023-04-28 | End: 2023-04-28

## 2023-04-28 RX ADMIN — MEPERIDINE HYDROCHLORIDE 25 MG: 25 INJECTION INTRAMUSCULAR; INTRAVENOUS; SUBCUTANEOUS at 17:34

## 2023-04-28 RX ADMIN — FENTANYL CITRATE 50 MCG: 50 INJECTION, SOLUTION INTRAMUSCULAR; INTRAVENOUS at 16:26

## 2023-04-28 RX ADMIN — PROPOFOL 200 MG: 10 INJECTION, EMULSION INTRAVENOUS at 15:53

## 2023-04-28 RX ADMIN — FENTANYL CITRATE 50 MCG: 50 INJECTION, SOLUTION INTRAMUSCULAR; INTRAVENOUS at 16:45

## 2023-04-28 RX ADMIN — ROCURONIUM BROMIDE 50 MG: 10 INJECTION, SOLUTION INTRAVENOUS at 15:53

## 2023-04-28 RX ADMIN — HYDROMORPHONE HYDROCHLORIDE 0.5 MG: 1 INJECTION, SOLUTION INTRAMUSCULAR; INTRAVENOUS; SUBCUTANEOUS at 17:59

## 2023-04-28 RX ADMIN — HYDROMORPHONE HYDROCHLORIDE 0.5 MG: 1 INJECTION, SOLUTION INTRAMUSCULAR; INTRAVENOUS; SUBCUTANEOUS at 18:10

## 2023-04-28 RX ADMIN — DEXAMETHASONE SODIUM PHOSPHATE 10 MG: 10 INJECTION INTRAMUSCULAR; INTRAVENOUS at 15:57

## 2023-04-28 RX ADMIN — CEFAZOLIN 2000 MG: 2 INJECTION, POWDER, FOR SOLUTION INTRAMUSCULAR; INTRAVENOUS at 16:02

## 2023-04-28 RX ADMIN — GLYCOPYRROLATE 0.6 MG: 0.2 INJECTION INTRAMUSCULAR; INTRAVENOUS at 17:11

## 2023-04-28 RX ADMIN — MIDAZOLAM HYDROCHLORIDE 2 MG: 1 INJECTION INTRAMUSCULAR; INTRAVENOUS at 15:45

## 2023-04-28 RX ADMIN — NEOSTIGMINE METHYLSULFATE 3 MG: 1 INJECTION, SOLUTION INTRAVENOUS at 17:11

## 2023-04-28 RX ADMIN — FENTANYL CITRATE 100 MCG: 50 INJECTION, SOLUTION INTRAMUSCULAR; INTRAVENOUS at 15:53

## 2023-04-28 RX ADMIN — LIDOCAINE HYDROCHLORIDE 50 MG: 20 INJECTION, SOLUTION INTRAVENOUS at 15:53

## 2023-04-28 RX ADMIN — ONDANSETRON 4 MG: 2 INJECTION INTRAMUSCULAR; INTRAVENOUS at 17:11

## 2023-04-28 RX ADMIN — FENTANYL CITRATE 50 MCG: 50 INJECTION, SOLUTION INTRAMUSCULAR; INTRAVENOUS at 17:19

## 2023-04-28 RX ADMIN — SODIUM CHLORIDE: 9 INJECTION, SOLUTION INTRAVENOUS at 13:14

## 2023-04-28 ASSESSMENT — PAIN SCALES - GENERAL
PAINLEVEL_OUTOF10: 0
PAINLEVEL_OUTOF10: 8
PAINLEVEL_OUTOF10: 3
PAINLEVEL_OUTOF10: 4
PAINLEVEL_OUTOF10: 7

## 2023-04-28 ASSESSMENT — PAIN - FUNCTIONAL ASSESSMENT
PAIN_FUNCTIONAL_ASSESSMENT: 0-10
PAIN_FUNCTIONAL_ASSESSMENT: PREVENTS OR INTERFERES SOME ACTIVE ACTIVITIES AND ADLS

## 2023-04-28 ASSESSMENT — PAIN DESCRIPTION - DESCRIPTORS
DESCRIPTORS: ACHING;DISCOMFORT
DESCRIPTORS: DISCOMFORT;TINGLING;NUMBNESS
DESCRIPTORS: ACHING;DISCOMFORT

## 2023-04-28 ASSESSMENT — LIFESTYLE VARIABLES: SMOKING_STATUS: 1

## 2023-04-28 ASSESSMENT — PAIN DESCRIPTION - LOCATION
LOCATION: BACK

## 2023-04-28 NOTE — DISCHARGE INSTRUCTIONS
Discharge Instructions:    1. No lifting more than 10 pounds. 2. Refrain from bending, twisting, or turning at the waist.   3. No brace is needed to be worn. 4. Walking is encouraged, slowly increase time and distance. 5. Can remove dressing and leave open to air two (2) days after surgery . 6. All stitches are under the skin and will dissolve in time. 7. Patient may shower. DO NOT soak or scrub at incision site. 8. Do not drive while taking narcotic pain medications. 9. No sexual activity for one (1) month after surgery   10. Take medications as prescribed. Continue taking stool softener while taking narcotic pain medications. 11. Follow up in the Neurosurgery clinic in 4-6 weeks. No films necessary.

## 2023-04-28 NOTE — ANESTHESIA POSTPROCEDURE EVALUATION
Department of Anesthesiology  Postprocedure Note    Patient: April D Jovi  MRN: [id number]  YOB: 1985  Date of evaluation: 4/28/2023      Procedure Summary     Date: 04/28/23 Room / Location: [facility] 06 / [facility] 75    Anesthesia Start: 2555 Anesthesia Stop: 5323    Procedure: Right L5-S1 hemilaminectomy and diskectomy (Right: Back) Diagnosis:       Displacement of lumbar intervertebral disc without myelopathy      (Displacement of lumbar intervertebral disc without myelopathy [M51.26])    Surgeons: Christopher Sharp MD Responsible Provider: Kelsie Estrada MD    Anesthesia Type: general ASA Status: 2          Anesthesia Type: No value filed.     Trish Phase I: Trish Score: 8    Trish Phase II:        Anesthesia Post Evaluation    Patient location during evaluation: PACU  Patient participation: complete - patient participated  Level of consciousness: awake and alert  Airway patency: patent  Nausea & Vomiting: no nausea and no vomiting  Complications: no  Cardiovascular status: blood pressure returned to baseline and hemodynamically stable  Respiratory status: acceptable and spontaneous ventilation  Hydration status: euvolemic  Multimodal analgesia pain management approach

## 2023-04-28 NOTE — BRIEF OP NOTE
Brief Postoperative Note      Patient: April D Jovi  YOB: 1985  MRN: [id number]    Date of Procedure: 4/28/2023    Pre-Op Diagnosis Codes:     * Displacement of lumbar intervertebral disc without myelopathy [M51.26]    Post-Op Diagnosis: Same       Procedure(s):  Right L5-S1 hemilaminectomy and diskectomy    Surgeon(s):  Beth Shaw MD    Assistant:  Physician Assistant: Rachel Raygoza PA-C    Anesthesia: General    Estimated Blood Loss (mL): Minimal    Complications: None    Specimens:   ID Type Source Tests Collected by Time Destination   A : L5-S1 DISC Bone Bone SURGICAL PATHOLOGY Beth Shaw MD 4/28/2023 1700        Implants:  * No implants in log *      Drains: * No LDAs found *    Findings: see dictated op note      Electronically signed by Jonnathan Esparza MD on 4/28/2023 at 5:14 PM

## 2023-04-28 NOTE — H&P
I have examined the patient and reviewed the H and P and no changes are noted.   She still has right leg pain    Jonas Villarreal MD

## 2023-04-28 NOTE — PROGRESS NOTES
Patient ambulated to the restroom and her sister assisted in dressing patient , Patient tolerated ambulation well

## 2023-04-29 NOTE — OP NOTE
[address]                  [address] [facility]  [facility]                                OPERATIVE REPORT    PATIENT NAME: Sarah Kay                      :        1985  MED REC NO:   [id number]                            ROOM:  ACCOUNT NO:   [de-identified]                           ADMIT DATE: 2023  PROVIDER:     Marlon Cheema MD    DATE OF PROCEDURE:  2023    PREOPERATIVE DIAGNOSIS: Right-sided L5-S1 herniated nucleus pulposus. POSTOPERATIVE DIAGNOSIS:  Right-sided L5-S1 herniated nucleus pulposus. OPERATIVE PROCEDURES:  1. Right-sided L5-S1 hemilaminotomy, right-sided L5-S1 medial  facetectomy, right-sided L5 and S1 foraminotomy, and right-sided L5-S1  diskectomy. 2.  Use of intraoperative fluoroscopy interpreted by myself, the  surgeon. 3.  AS modifier for Marine Allen PA-C, who assisted with primary  exposure and primary closure. ANESTHESIA:  Generalized endotracheal anesthesia    SURGEON:  Marlon Cheema MD    ASSISTANT:  Marine Allen PA-C    COMPLICATIONS:  None. ESTIMATED BLOOD LOSS:  Minimal.    SPECIMEN:  Disk. OPERATIVE INDICATIONS:  The patient is a 34-year-old lady who presented  to the office complaining of back pain that radiated into her right leg. She had numbness, tingling and weakness. She had failed conservative  therapy including epidurals and physical therapy. MRI showed that she  had a large right-sided L5-S1 herniated disk, and after risks, benefits,  and alternatives were discussed with the patient, it was determined that  she would undergo the above-listed procedure. Of note, Marine Allen PA-C'S services were required as she was the only  qualified assistant to assist with primary exposure and primary closure. OPERATIVE PROCEDURE IN DETAIL:  The patient was brought into the  operating room.   A time-out was performed where was she identified by  her name, medical record number, and the

## 2023-05-11 ENCOUNTER — TELEPHONE (OUTPATIENT)
Dept: PAIN MANAGEMENT | Age: 38
End: 2023-05-11

## 2023-05-12 RX ORDER — GABAPENTIN 300 MG/1
CAPSULE ORAL
Qty: 81 CAPSULE | Refills: 0 | OUTPATIENT
Start: 2023-05-12

## 2023-05-15 RX ORDER — GABAPENTIN 300 MG/1
300 CAPSULE ORAL 3 TIMES DAILY
Qty: 90 CAPSULE | Refills: 1 | Status: SHIPPED | OUTPATIENT
Start: 2023-05-15 | End: 2023-07-14

## 2023-05-18 RX ORDER — GABAPENTIN 300 MG/1
CAPSULE ORAL
Qty: 81 CAPSULE | Refills: 0 | OUTPATIENT
Start: 2023-05-18

## 2023-05-30 ENCOUNTER — OFFICE VISIT (OUTPATIENT)
Dept: NEUROSURGERY | Age: 38
End: 2023-05-30
Payer: COMMERCIAL

## 2023-05-30 VITALS
DIASTOLIC BLOOD PRESSURE: 76 MMHG | OXYGEN SATURATION: 98 % | HEART RATE: 80 BPM | SYSTOLIC BLOOD PRESSURE: 119 MMHG | BODY MASS INDEX: 21.43 KG/M2 | HEIGHT: 63 IN

## 2023-05-30 DIAGNOSIS — Z98.890 S/P LAMINECTOMY: Primary | ICD-10-CM

## 2023-05-30 PROCEDURE — 99212 OFFICE O/P EST SF 10 MIN: CPT

## 2023-05-30 PROCEDURE — 99024 POSTOP FOLLOW-UP VISIT: CPT | Performed by: STUDENT IN AN ORGANIZED HEALTH CARE EDUCATION/TRAINING PROGRAM

## 2023-05-30 RX ORDER — CYCLOBENZAPRINE HCL 10 MG
10 TABLET ORAL 3 TIMES DAILY PRN
Qty: 30 TABLET | Refills: 0 | Status: SHIPPED | OUTPATIENT
Start: 2023-05-30 | End: 2023-06-09

## 2023-05-30 RX ORDER — OXYCODONE HYDROCHLORIDE 5 MG/1
5 TABLET ORAL EVERY 4 HOURS PRN
Qty: 42 TABLET | Refills: 0 | Status: SHIPPED | OUTPATIENT
Start: 2023-05-30 | End: 2023-06-06

## 2023-05-30 NOTE — PROGRESS NOTES
Post-Operative Follow-up     This is a 43year old who presents to the office for a 1 month follow-up s/p L5-S1 hemilaminectomy      Subjective: Patient states she is doing okay. She still having sharp low back pain that radiates down her right leg. She admits to associated numbness and weakness with this pain. Has continued with restrictions. Denies any recent trauma. Physical Exam:              WDWN, no apparent distress              Non-labored breathing               Vitals Stable              Alert and oriented x3              CN 3-12 intact              PERRL              EOMI              GÓMEZ well              RLE 4+/5 secondary to pain otherwise Motor strength symmetric              Sensation to LT intact bilaterally       Incision healed well without signs of infection. Assessment: This is a 43 y.o.  female presenting for a 1 month follow-up s/p L5-S1 hemilaminectomy.       Plan:  -Pain control and expectations discussed, pain medication refilled  -Obtain MRI Lumbar W/WO spine to evaluate for stenosis   -OARRS report reviewed   -Continue with restrictions.   -Call/Return to Neurosurgery clinic after completion of imaging to discuss results and further treatment plan  -Call/return sooner if symptoms worsen or new issues arise in the interim       Electronically signed by Susanna Jones PA-C on 5/30/2023 at 5:11 PM

## 2023-06-07 ENCOUNTER — OFFICE VISIT (OUTPATIENT)
Dept: PAIN MANAGEMENT | Age: 38
End: 2023-06-07
Payer: COMMERCIAL

## 2023-06-07 VITALS
HEART RATE: 75 BPM | WEIGHT: 121 LBS | DIASTOLIC BLOOD PRESSURE: 60 MMHG | RESPIRATION RATE: 18 BRPM | OXYGEN SATURATION: 95 % | TEMPERATURE: 97.7 F | BODY MASS INDEX: 21.44 KG/M2 | HEIGHT: 63 IN | SYSTOLIC BLOOD PRESSURE: 110 MMHG

## 2023-06-07 DIAGNOSIS — M51.36 DDD (DEGENERATIVE DISC DISEASE), LUMBAR: Primary | ICD-10-CM

## 2023-06-07 DIAGNOSIS — M54.16 LUMBAR RADICULAR PAIN: ICD-10-CM

## 2023-06-07 DIAGNOSIS — M47.817 LUMBOSACRAL SPONDYLOSIS WITHOUT MYELOPATHY: ICD-10-CM

## 2023-06-07 PROCEDURE — 4004F PT TOBACCO SCREEN RCVD TLK: CPT | Performed by: ANESTHESIOLOGY

## 2023-06-07 PROCEDURE — G8427 DOCREV CUR MEDS BY ELIG CLIN: HCPCS | Performed by: ANESTHESIOLOGY

## 2023-06-07 PROCEDURE — G8420 CALC BMI NORM PARAMETERS: HCPCS | Performed by: ANESTHESIOLOGY

## 2023-06-07 PROCEDURE — 99213 OFFICE O/P EST LOW 20 MIN: CPT | Performed by: ANESTHESIOLOGY

## 2023-06-07 NOTE — PROGRESS NOTES
Ashley Tovar presents to the [facility] on 6/7/2023. April is complaining of pain low back, right leg. The pain is constant. The pain is described as aching, throbbing, stabbing, sharp, burning, and numb. Pain is rated on her best day at a 3, on her worst day at a 8, and on average at a 3 on the VAS scale. She took her last dose of Norco, Neurontin, and Flexeril  today. April does not have issues with constipation. Any procedures since your last visit: No  She is not on NSAIDS and  is not on anticoagulation medications     Pacemaker or defibrillator: No   Medication Contract and Consent for Opioid Use Documents Filed        No documents found                       /60   Pulse 75   Temp 97.7 °F (36.5 °C) (Infrared)   Resp 18   Ht 5' 3\" (1.6 m)   Wt 121 lb (54.9 kg)   LMP 03/20/2018   SpO2 95%   BMI 21.43 kg/m²      Patient's last menstrual period was 03/20/2018.

## 2023-06-07 NOTE — PROGRESS NOTES
[address] [place] [address], [address] [place] [address] [place]  [phone]    Follow up Note      April D Bever     Date of Visit:  6/7/2023    CC:  Patient presents for follow up   Chief Complaint   Patient presents with    Back Pain       HPI:    H/o low back and LE pain. Failed conservative treatment. S/P right L5-S1 hemilaminectomy and discectomy on 4/27/2023 by Dr. Alla Bamberger. Has residual low back  LE pain. Has recent follow up with [facility]- new MRI ordered. Nursing notes and details of the pain history reviewed. Please see intake notes for details. She has not been on anticoagulation medications. Previous treatments:   Physical Therapy : yes, continues HEP. Chiropractic treatment: yes     Medications: - NSAID's : yes                        - Membrane stabilizers : yes                       - Opioids : no                       - Adjuvants or Others : yes     Spine Surgeries: yes     Interventional Pain procedures/ nerve blocks: yes,      Anticoagulation medications: no     H/O Smoking: yes  H/O alcohol abuse : no  H/O Illicit drug use : CBD - daily. 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 Imaging studies:    MRI of LS spine  after surgery ordered- Pending    MRI of the lumbar spine 3/15/2023: (has undergone surgery since this MRI)  Impression   1. Large right paracentral disc protrusion at L5-S1 resulting in severe   stenoses of the central canal and right lateral recess. It results in   impingement of multiple nerve roots, including the right S1 nerve. 2.  No fracture or bony destructive lesion. OARRS report::OARRS reviewed.     Past Medical History:   Diagnosis Date    Chronic pain     Lumbar pain     OCD (obsessive compulsive disorder)     Osteopenia        Past Surgical History:   Procedure Laterality Date    APPENDECTOMY      FOOT SURGERY Bilateral     bilateral  multiple surgeries    HYSTERECTOMY (CERVIX

## 2023-06-09 ENCOUNTER — HOSPITAL ENCOUNTER (OUTPATIENT)
Dept: MRI IMAGING | Age: 38
End: 2023-06-09
Payer: COMMERCIAL

## 2023-06-09 DIAGNOSIS — Z98.890 S/P LAMINECTOMY: ICD-10-CM

## 2023-06-09 PROCEDURE — 6360000004 HC RX CONTRAST MEDICATION: Performed by: RADIOLOGY

## 2023-06-09 PROCEDURE — A9577 INJ MULTIHANCE: HCPCS | Performed by: RADIOLOGY

## 2023-06-09 PROCEDURE — 72158 MRI LUMBAR SPINE W/O & W/DYE: CPT

## 2023-06-09 RX ADMIN — GADOBENATE DIMEGLUMINE 11 ML: 529 INJECTION, SOLUTION INTRAVENOUS at 09:25

## 2023-07-26 ENCOUNTER — OFFICE VISIT (OUTPATIENT)
Dept: NEUROSURGERY | Age: 38
End: 2023-07-26
Payer: COMMERCIAL

## 2023-07-26 VITALS
WEIGHT: 121 LBS | OXYGEN SATURATION: 97 % | HEART RATE: 79 BPM | DIASTOLIC BLOOD PRESSURE: 68 MMHG | HEIGHT: 63 IN | SYSTOLIC BLOOD PRESSURE: 107 MMHG | BODY MASS INDEX: 21.44 KG/M2 | TEMPERATURE: 98.1 F

## 2023-07-26 DIAGNOSIS — M51.26 LUMBAR HERNIATED DISC: ICD-10-CM

## 2023-07-26 DIAGNOSIS — Z98.890 S/P LAMINECTOMY: Primary | ICD-10-CM

## 2023-07-26 PROCEDURE — 99212 OFFICE O/P EST SF 10 MIN: CPT

## 2023-07-26 PROCEDURE — 99024 POSTOP FOLLOW-UP VISIT: CPT | Performed by: STUDENT IN AN ORGANIZED HEALTH CARE EDUCATION/TRAINING PROGRAM

## 2023-07-26 RX ORDER — MEPERIDINE HYDROCHLORIDE 25 MG/ML
12.5 INJECTION INTRAMUSCULAR; INTRAVENOUS; SUBCUTANEOUS
COMMUNITY
Start: 2023-04-28

## 2023-07-26 NOTE — PROGRESS NOTES
Post-Operative Follow-up     This is a 43year old who presents to the office for a 3 month follow-up s/p L5-S1 hemilaminectomy      Subjective: Patient states she is not doing well. She she continues to have low back pain and her right leg is numb. She states it is hard to drive because of it. She states she gets some relief with lying down, pain is worse with movement. She denies any bowel or bladder incontinence. She has tried PT and MILLY in the past with no relief. MRI reviewed with patient     Physical Exam:              WDWN, no apparent distress              Non-labored breathing               Vitals Stable              Alert and oriented x3              CN 3-12 intact              PERRL              EOMI              GÓMEZ well              RLE 4+/5 secondary to pain otherwise Motor strength symmetric              Sensation to LT intact bilaterally       Incision healed well without signs of infection. Imagin/10/2023  MRI Lumbar Spine   Postoperative change as detailed above. There is an anterior epidural mass centered in the right subarticular zone which only partially enhances peripherally. This suggests a recurrent disc extrusion with surrounding scar/granulation tissue causing right lateral recess stenosis and mass effect on the right S1 nerve root. Assessment: This is a 43 y.o.  female presenting for a 3 month follow-up s/p L5-S1 hemilaminectomy.       Plan:  -Pain control and expectations discussed  -Reviewed and discussed MRI Lumbar W/WO spine in detail, patient to follow up with Dr. Dorie Olszewski to discuss treatment options   -OARRS report reviewed   -Call/Return to Neurosurgery clinic after completion of imaging to discuss results and further treatment plan  -Call/return sooner if symptoms worsen or new issues arise in the interim     Electronically signed by Karly Chase PA-C on 2023 at 12:37 PM

## 2023-08-03 ENCOUNTER — OFFICE VISIT (OUTPATIENT)
Dept: NEUROSURGERY | Age: 38
End: 2023-08-03
Payer: COMMERCIAL

## 2023-08-03 DIAGNOSIS — M54.41 ACUTE MIDLINE LOW BACK PAIN WITH RIGHT-SIDED SCIATICA: Primary | ICD-10-CM

## 2023-08-03 PROCEDURE — 99212 OFFICE O/P EST SF 10 MIN: CPT

## 2023-08-03 PROCEDURE — G8420 CALC BMI NORM PARAMETERS: HCPCS | Performed by: NEUROLOGICAL SURGERY

## 2023-08-03 PROCEDURE — G8427 DOCREV CUR MEDS BY ELIG CLIN: HCPCS | Performed by: NEUROLOGICAL SURGERY

## 2023-08-03 PROCEDURE — 4004F PT TOBACCO SCREEN RCVD TLK: CPT | Performed by: NEUROLOGICAL SURGERY

## 2023-08-03 PROCEDURE — 99212 OFFICE O/P EST SF 10 MIN: CPT | Performed by: NEUROLOGICAL SURGERY

## 2023-08-03 NOTE — PROGRESS NOTES
Patient is here for follow up consult for: back and right leg pain and weakness    Physical exam  Alert and Oriented X3  PERRLA, EOMI  GÓMEZ 5/5 except 4/5 in RLE  Sensation intact to LT and PP except decreased sensation in right L5 and S1 dermatome  Reflexes are 2+ and symmetric    A/P: patient is here for follow up for: back pain. She has a recurrent right L5-S1 herniated disk.   I am recommending an L5-1 posterior lumbar interbody fusion but she needs to quit smoking first    Michael Hare MD

## 2023-09-20 ENCOUNTER — TELEPHONE (OUTPATIENT)
Dept: NEUROSURGERY | Age: 38
End: 2023-09-20

## 2023-09-21 DIAGNOSIS — M51.26 LUMBAR HERNIATED DISC: Primary | ICD-10-CM

## 2023-10-16 ENCOUNTER — TELEPHONE (OUTPATIENT)
Dept: NEUROSURGERY | Age: 38
End: 2023-10-16

## 2023-10-16 PROBLEM — M51.16 LUMBAR DISC HERNIATION WITH RADICULOPATHY: Status: ACTIVE | Noted: 2023-10-16

## 2023-10-16 NOTE — TELEPHONE ENCOUNTER
Prior Authorization Form:      DEMOGRAPHICS:                     Patient Name:   Jovi  Patient :  1985            Insurance:  Payor: Peace Khan / Plan: Keith Alvarez / Product Type: *No Product type* /   Insurance ID Number:    Payer/Plan Subscr  Sex Relation Sub. Ins. ID Effective Group Num   1. Mortimer Grade D 1985 Female Self [id number]                                   [address]   2.  [id number] 1985 Female Self [id number]                                   [address]         DIAGNOSIS & PROCEDURE:                       Procedure/Operation: L5-S1 posterior lumbar interbody fusion           CPT Code: 27537, [id number], 48388, 41383, 91129, 23490, 14854    Diagnosis:  L5-S1 recurrent disc herniation     ICD10 Code: M51.16    Location:  main    Surgeon:  Jon General INFORMATION:                          Date: 11/3/23    Time: 7am              Anesthesia: general                                                       Status:  Outpatient        Special Comments:  Globus:  YUDI Man cancellous chips       Electronically signed by Atif Russell MA on 10/16/2023 at 9:00 AM

## 2023-10-25 RX ORDER — CALCIUM CARBONATE 500(1250)
500 TABLET ORAL DAILY
COMMUNITY
End: 2023-10-25

## 2023-10-25 RX ORDER — VARENICLINE TARTRATE 1 MG/1
1 TABLET, FILM COATED ORAL 2 TIMES DAILY
COMMUNITY
Start: 2023-10-10

## 2023-10-25 NOTE — PROGRESS NOTES
the medicine, give this list to the nurse in Pre-Op. [x] Take only the following medications the morning of surgery with 1-2 ounces of water: gabapentin (Neurontin). [x] Stop all herbal supplements and vitamins 5 days before surgery. Stop NSAIDS 7 days before surgery. WHAT TO EXPECT:    [x] The day of surgery you will be greeted and checked in by the [employer].  In addition, you will be registered in the [facility] by a Patient Access Representative. Please bring your photo ID and insurance card. A nurse will greet you in accordance to the time you are needed in the pre-op area to prepare you for surgery. Please do not be discouraged if you are not greeted in the order you arrive as there are many variables that are involved in patient preparation. Your patience is greatly appreciated as you wait for your nurse. [x]  Delays may occur with surgery and staff will make a sincere effort to keep you informed of delays. If any delays occur with your procedure, we apologize ahead of time for your inconvenience as we recognize the value of your time.

## 2023-10-31 ENCOUNTER — HOSPITAL ENCOUNTER (OUTPATIENT)
Dept: PREADMISSION TESTING | Age: 38
Discharge: HOME | End: 2023-10-31

## 2023-10-31 ENCOUNTER — HOSPITAL ENCOUNTER (OUTPATIENT)
Dept: GENERAL RADIOLOGY | Age: 38
Discharge: HOME | End: 2023-11-02
Payer: COMMERCIAL

## 2023-10-31 ENCOUNTER — HOSPITAL ENCOUNTER (OUTPATIENT)
Dept: PREADMISSION TESTING | Age: 38
Setting detail: OUTPATIENT SURGERY
Discharge: HOME | End: 2023-10-31
Payer: COMMERCIAL

## 2023-10-31 VITALS
BODY MASS INDEX: 23.41 KG/M2 | OXYGEN SATURATION: 99 % | HEIGHT: 63 IN | DIASTOLIC BLOOD PRESSURE: 55 MMHG | HEART RATE: 58 BPM | TEMPERATURE: 97.7 F | SYSTOLIC BLOOD PRESSURE: 117 MMHG | RESPIRATION RATE: 16 BRPM | WEIGHT: 132.1 LBS

## 2023-10-31 DIAGNOSIS — M51.16 LUMBAR DISC HERNIATION WITH RADICULOPATHY: ICD-10-CM

## 2023-10-31 DIAGNOSIS — Z01.812 PRE-OPERATIVE LABORATORY EXAMINATION: Primary | ICD-10-CM

## 2023-10-31 DIAGNOSIS — Z01.812 PRE-OPERATIVE LABORATORY EXAMINATION: ICD-10-CM

## 2023-10-31 LAB
ABO + RH BLD: NORMAL
ANION GAP SERPL CALCULATED.3IONS-SCNC: 7 MMOL/L (ref 7–16)
ARM BAND NUMBER: NORMAL
BASOPHILS # BLD: 0.04 K/UL (ref 0–0.2)
BASOPHILS NFR BLD: 1 % (ref 0–2)
BILIRUB UR QL STRIP: NEGATIVE
BLOOD BANK SAMPLE EXPIRATION: NORMAL
BLOOD GROUP ANTIBODIES SERPL: NEGATIVE
BUN SERPL-MCNC: 12 MG/DL (ref 6–20)
CALCIUM SERPL-MCNC: 9.4 MG/DL (ref 8.6–10.2)
CHLORIDE SERPL-SCNC: 101 MMOL/L (ref 98–107)
CLARITY UR: CLEAR
CO2 SERPL-SCNC: 29 MMOL/L (ref 22–29)
COLOR UR: YELLOW
COMMENT: NORMAL
CREAT SERPL-MCNC: 0.6 MG/DL (ref 0.5–1)
EKG ATRIAL RATE: 64 BPM
EKG P AXIS: 73 DEGREES
EKG P-R INTERVAL: 144 MS
EKG Q-T INTERVAL: 386 MS
EKG QRS DURATION: 86 MS
EKG QTC CALCULATION (BAZETT): 398 MS
EKG R AXIS: 75 DEGREES
EKG T AXIS: 55 DEGREES
EKG VENTRICULAR RATE: 64 BPM
EOSINOPHIL # BLD: 0.21 K/UL (ref 0.05–0.5)
EOSINOPHILS RELATIVE PERCENT: 3 % (ref 0–6)
ERYTHROCYTE [DISTWIDTH] IN BLOOD BY AUTOMATED COUNT: 12.2 % (ref 11.5–15)
GFR SERPL CREATININE-BSD FRML MDRD: >60 ML/MIN/1.73M2
GLUCOSE SERPL-MCNC: 81 MG/DL (ref 74–99)
GLUCOSE UR STRIP-MCNC: NEGATIVE MG/DL
HCT VFR BLD AUTO: 41.7 % (ref 34–48)
HGB BLD-MCNC: 13.7 G/DL (ref 11.5–15.5)
HGB UR QL STRIP.AUTO: NEGATIVE
IMM GRANULOCYTES # BLD AUTO: <0.03 K/UL (ref 0–0.58)
IMM GRANULOCYTES NFR BLD: 0 % (ref 0–5)
INR PPP: 1
KETONES UR STRIP-MCNC: NEGATIVE MG/DL
LEUKOCYTE ESTERASE UR QL STRIP: NEGATIVE
LYMPHOCYTES NFR BLD: 1.55 K/UL (ref 1.5–4)
LYMPHOCYTES RELATIVE PERCENT: 22 % (ref 20–42)
MCH RBC QN AUTO: 32.1 PG (ref 26–35)
MCHC RBC AUTO-ENTMCNC: 32.9 G/DL (ref 32–34.5)
MCV RBC AUTO: 97.7 FL (ref 80–99.9)
MONOCYTES NFR BLD: 0.55 K/UL (ref 0.1–0.95)
MONOCYTES NFR BLD: 8 % (ref 2–12)
NEUTROPHILS NFR BLD: 66 % (ref 43–80)
NEUTS SEG NFR BLD: 4.72 K/UL (ref 1.8–7.3)
NITRITE UR QL STRIP: NEGATIVE
PH UR STRIP: 7 [PH] (ref 5–9)
PLATELET # BLD AUTO: 267 K/UL (ref 130–450)
PMV BLD AUTO: 9.9 FL (ref 7–12)
POTASSIUM SERPL-SCNC: 4.3 MMOL/L (ref 3.5–5)
PROT UR STRIP-MCNC: NEGATIVE MG/DL
PROTHROMBIN TIME: 10.7 SEC (ref 9.3–12.4)
RBC # BLD AUTO: 4.27 M/UL (ref 3.5–5.5)
SODIUM SERPL-SCNC: 137 MMOL/L (ref 132–146)
SP GR UR STRIP: 1.01 (ref 1–1.03)
UROBILINOGEN UR STRIP-ACNC: 0.2 EU/DL (ref 0–1)
WBC OTHER # BLD: 7.1 K/UL (ref 4.5–11.5)

## 2023-10-31 PROCEDURE — 71046 X-RAY EXAM CHEST 2 VIEWS: CPT

## 2023-10-31 PROCEDURE — 86900 BLOOD TYPING SEROLOGIC ABO: CPT

## 2023-10-31 PROCEDURE — 85025 COMPLETE CBC W/AUTO DIFF WBC: CPT

## 2023-10-31 PROCEDURE — 86901 BLOOD TYPING SEROLOGIC RH(D): CPT

## 2023-10-31 PROCEDURE — 87086 URINE CULTURE/COLONY COUNT: CPT

## 2023-10-31 PROCEDURE — 85610 PROTHROMBIN TIME: CPT

## 2023-10-31 PROCEDURE — 81003 URINALYSIS AUTO W/O SCOPE: CPT

## 2023-10-31 PROCEDURE — 80048 BASIC METABOLIC PNL TOTAL CA: CPT

## 2023-10-31 PROCEDURE — G0480 DRUG TEST DEF 1-7 CLASSES: HCPCS

## 2023-10-31 PROCEDURE — 93005 ELECTROCARDIOGRAM TRACING: CPT

## 2023-10-31 PROCEDURE — 93010 ELECTROCARDIOGRAM REPORT: CPT | Performed by: INTERNAL MEDICINE

## 2023-10-31 PROCEDURE — 86850 RBC ANTIBODY SCREEN: CPT

## 2023-10-31 PROCEDURE — 36415 COLL VENOUS BLD VENIPUNCTURE: CPT

## 2023-10-31 PROCEDURE — 87081 CULTURE SCREEN ONLY: CPT

## 2023-11-01 LAB
MICROORGANISM SPEC CULT: NORMAL
SPECIMEN DESCRIPTION: NORMAL

## 2023-11-02 ENCOUNTER — ANESTHESIA EVENT (OUTPATIENT)
Dept: OPERATING ROOM | Age: 38
End: 2023-11-02
Payer: COMMERCIAL

## 2023-11-02 LAB
MICROORGANISM SPEC CULT: NO GROWTH
SPECIMEN DESCRIPTION: NORMAL

## 2023-11-02 NOTE — H&P
Ashley Espana (:  1985) is a 43 y.o. female,New patient, here for evaluation of the following chief complaint(s):  Follow-up (Been having issues with herniated disc for over 20 years  the last therapy was at [facility] pain management last injection was [facility] a week ago currently in pain management up until yesterday and chiropractor  )        ASSESSMENT/PLAN:  1. Acute midline low back pain with right-sided sciatica  43year old lady who presents with back and right leg pain. Her MRI shows a right L5-S1 herniated disk. The pain is affecting her activities of daily living such as self care. She has failed physical therapy and epidural steroid injections. I am recommending a right L5-S1 hemilaminotomy and diskectomy Risks, benefits and alternatives of surgery have been discussed and she wishes to proceed. No follow-ups on file. Subjective   SUBJECTIVE/OBJECTIVE:  HPI  43year old lady who presents with back pain. The pain radiates into her right leg. She has numbness, tingling and weakness in her right leg. The pain is described as sharp, stabbing and aching and burning. It is made worse with activity and better with rest.  She denies loss of control of bowel or bladder function. The pain does not change with time of day. The pain is rated as 7/10. She has tried PT and epidurals.     Past Medical History:   Diagnosis Date    Chronic pain     Lumbar pain     OCD (obsessive compulsive disorder)     Osteopenia      Past Surgical History:   Procedure Laterality Date    APPENDECTOMY      FOOT SURGERY Bilateral     bilateral  multiple surgeries    HYSTERECTOMY (CERVIX STATUS UNKNOWN)      LAMINECTOMY Right 2023    Right L5-S1 hemilaminectomy and diskectomy performed by Essie Randall MD at [address] [place] [address] 2015    endoscopic septoplasty submucosal inferior resection turbinate    OTHER SURGICAL HISTORY N/A 2017    provocative discogram L4-5, L5-S1    OVARIAN CYST SURGERY

## 2023-11-03 ENCOUNTER — APPOINTMENT (OUTPATIENT)
Dept: GENERAL RADIOLOGY | Age: 38
End: 2023-11-03
Attending: NEUROLOGICAL SURGERY
Payer: COMMERCIAL

## 2023-11-03 ENCOUNTER — ANESTHESIA (OUTPATIENT)
Dept: OPERATING ROOM | Age: 38
End: 2023-11-03
Payer: COMMERCIAL

## 2023-11-03 ENCOUNTER — HOSPITAL ENCOUNTER (OUTPATIENT)
Age: 38
Setting detail: OBSERVATION
Discharge: HOME | End: 2023-11-05
Attending: NEUROLOGICAL SURGERY | Admitting: NEUROLOGICAL SURGERY
Payer: COMMERCIAL

## 2023-11-03 DIAGNOSIS — M51.16 LUMBAR DISC HERNIATION WITH RADICULOPATHY: ICD-10-CM

## 2023-11-03 DIAGNOSIS — M51.26 RECURRENT HERNIATION OF LUMBAR DISC: Primary | ICD-10-CM

## 2023-11-03 PROCEDURE — 88304 TISSUE EXAM BY PATHOLOGIST: CPT

## 2023-11-03 PROCEDURE — 2580000003 HC RX 258: Performed by: NEUROLOGICAL SURGERY

## 2023-11-03 PROCEDURE — 97530 THERAPEUTIC ACTIVITIES: CPT

## 2023-11-03 PROCEDURE — 2580000003 HC RX 258: Performed by: PHYSICIAN ASSISTANT

## 2023-11-03 PROCEDURE — 2500000003 HC RX 250 WO HCPCS: Performed by: NEUROLOGICAL SURGERY

## 2023-11-03 PROCEDURE — C1713 ANCHOR/SCREW BN/BN,TIS/BN: HCPCS | Performed by: NEUROLOGICAL SURGERY

## 2023-11-03 PROCEDURE — 22633 ARTHRD CMBN 1NTRSPC LUMBAR: CPT | Performed by: PHYSICIAN ASSISTANT

## 2023-11-03 PROCEDURE — G0378 HOSPITAL OBSERVATION PER HR: HCPCS

## 2023-11-03 PROCEDURE — 61783 SCAN PROC SPINAL: CPT | Performed by: NEUROLOGICAL SURGERY

## 2023-11-03 PROCEDURE — C1889 IMPLANT/INSERT DEVICE, NOC: HCPCS | Performed by: NEUROLOGICAL SURGERY

## 2023-11-03 PROCEDURE — 6360000002 HC RX W HCPCS

## 2023-11-03 PROCEDURE — 2580000003 HC RX 258

## 2023-11-03 PROCEDURE — 22840 INSERT SPINE FIXATION DEVICE: CPT | Performed by: NEUROLOGICAL SURGERY

## 2023-11-03 PROCEDURE — 6360000002 HC RX W HCPCS: Performed by: NEUROLOGICAL SURGERY

## 2023-11-03 PROCEDURE — 22853 INSJ BIOMECHANICAL DEVICE: CPT | Performed by: PHYSICIAN ASSISTANT

## 2023-11-03 PROCEDURE — 63052 LAM FACETC/FRMT ARTHRD LUM 1: CPT | Performed by: NEUROLOGICAL SURGERY

## 2023-11-03 PROCEDURE — 97535 SELF CARE MNGMENT TRAINING: CPT

## 2023-11-03 PROCEDURE — 3700000000 HC ANESTHESIA ATTENDED CARE: Performed by: NEUROLOGICAL SURGERY

## 2023-11-03 PROCEDURE — 3700000001 HC ADD 15 MINUTES (ANESTHESIA): Performed by: NEUROLOGICAL SURGERY

## 2023-11-03 PROCEDURE — 97161 PT EVAL LOW COMPLEX 20 MIN: CPT

## 2023-11-03 PROCEDURE — 95908 NRV CNDJ TST 3-4 STUDIES: CPT | Performed by: AUDIOLOGIST

## 2023-11-03 PROCEDURE — 2500000003 HC RX 250 WO HCPCS

## 2023-11-03 PROCEDURE — 63052 LAM FACETC/FRMT ARTHRD LUM 1: CPT | Performed by: PHYSICIAN ASSISTANT

## 2023-11-03 PROCEDURE — 22840 INSERT SPINE FIXATION DEVICE: CPT | Performed by: PHYSICIAN ASSISTANT

## 2023-11-03 PROCEDURE — 6370000000 HC RX 637 (ALT 250 FOR IP): Performed by: PHYSICIAN ASSISTANT

## 2023-11-03 PROCEDURE — 22853 INSJ BIOMECHANICAL DEVICE: CPT | Performed by: NEUROLOGICAL SURGERY

## 2023-11-03 PROCEDURE — 3600000015 HC SURGERY LEVEL 5 ADDTL 15MIN: Performed by: NEUROLOGICAL SURGERY

## 2023-11-03 PROCEDURE — 7100000001 HC PACU RECOVERY - ADDTL 15 MIN: Performed by: NEUROLOGICAL SURGERY

## 2023-11-03 PROCEDURE — 3600000005 HC SURGERY LEVEL 5 BASE: Performed by: NEUROLOGICAL SURGERY

## 2023-11-03 PROCEDURE — A4217 STERILE WATER/SALINE, 500 ML: HCPCS | Performed by: NEUROLOGICAL SURGERY

## 2023-11-03 PROCEDURE — 7100000000 HC PACU RECOVERY - FIRST 15 MIN: Performed by: NEUROLOGICAL SURGERY

## 2023-11-03 PROCEDURE — 2709999900 HC NON-CHARGEABLE SUPPLY: Performed by: NEUROLOGICAL SURGERY

## 2023-11-03 PROCEDURE — 6370000000 HC RX 637 (ALT 250 FOR IP): Performed by: NEUROLOGICAL SURGERY

## 2023-11-03 PROCEDURE — 97165 OT EVAL LOW COMPLEX 30 MIN: CPT

## 2023-11-03 PROCEDURE — 2720000010 HC SURG SUPPLY STERILE: Performed by: NEUROLOGICAL SURGERY

## 2023-11-03 PROCEDURE — 95940 IONM IN OPERATNG ROOM 15 MIN: CPT | Performed by: AUDIOLOGIST

## 2023-11-03 PROCEDURE — 2500000003 HC RX 250 WO HCPCS: Performed by: ANESTHESIOLOGY

## 2023-11-03 PROCEDURE — 22633 ARTHRD CMBN 1NTRSPC LUMBAR: CPT | Performed by: NEUROLOGICAL SURGERY

## 2023-11-03 PROCEDURE — C1729 CATH, DRAINAGE: HCPCS | Performed by: NEUROLOGICAL SURGERY

## 2023-11-03 PROCEDURE — 6360000002 HC RX W HCPCS: Performed by: PHYSICIAN ASSISTANT

## 2023-11-03 DEVICE — IMPLANTABLE DEVICE: Type: IMPLANTABLE DEVICE | Site: SPINE LUMBAR | Status: FUNCTIONAL

## 2023-11-03 DEVICE — SABLE SPACER, 10X22, 6-12MM, 8°
Type: IMPLANTABLE DEVICE | Site: SPINE LUMBAR | Status: FUNCTIONAL
Brand: SABLE

## 2023-11-03 DEVICE — CREO® THREADED 6.5 X 35MM POLYAXIAL SCREW
Type: IMPLANTABLE DEVICE | Site: SPINE LUMBAR | Status: FUNCTIONAL
Brand: CREO

## 2023-11-03 DEVICE — 5.5MM CURVED ROD, TITANIUM ALLOY, 40MM LENGTH
Type: IMPLANTABLE DEVICE | Site: SPINE LUMBAR | Status: FUNCTIONAL
Brand: CREO

## 2023-11-03 DEVICE — VIASORB STRP 20X50X5MM: Type: IMPLANTABLE DEVICE | Site: SPINE LUMBAR | Status: FUNCTIONAL

## 2023-11-03 DEVICE — 5.5MM CURVED ROD, TITANIUM ALLOY, 45MM LENGTH
Type: IMPLANTABLE DEVICE | Site: SPINE LUMBAR | Status: FUNCTIONAL
Brand: CREO

## 2023-11-03 DEVICE — THREADED LOCKING CAP, CREO
Type: IMPLANTABLE DEVICE | Site: SPINE LUMBAR | Status: FUNCTIONAL
Brand: CREO

## 2023-11-03 DEVICE — CARTRIDGE GRFT DEL 5 CC DBM INSTAFILL: Type: IMPLANTABLE DEVICE | Site: SPINE LUMBAR | Status: FUNCTIONAL

## 2023-11-03 RX ORDER — DOCUSATE SODIUM 100 MG/1
200 CAPSULE, LIQUID FILLED ORAL 2 TIMES DAILY
Status: DISCONTINUED | OUTPATIENT
Start: 2023-11-03 | End: 2023-11-05 | Stop reason: HOSPADM

## 2023-11-03 RX ORDER — LIDOCAINE HYDROCHLORIDE 20 MG/ML
INJECTION, SOLUTION INTRAVENOUS PRN
Status: DISCONTINUED | OUTPATIENT
Start: 2023-11-03 | End: 2023-11-03 | Stop reason: SDUPTHER

## 2023-11-03 RX ORDER — SENNA AND DOCUSATE SODIUM 50; 8.6 MG/1; MG/1
1 TABLET, FILM COATED ORAL 2 TIMES DAILY
Status: DISCONTINUED | OUTPATIENT
Start: 2023-11-03 | End: 2023-11-05 | Stop reason: HOSPADM

## 2023-11-03 RX ORDER — SODIUM CHLORIDE 0.9 % (FLUSH) 0.9 %
5-40 SYRINGE (ML) INJECTION EVERY 12 HOURS SCHEDULED
Status: DISCONTINUED | OUTPATIENT
Start: 2023-11-03 | End: 2023-11-03 | Stop reason: HOSPADM

## 2023-11-03 RX ORDER — IPRATROPIUM BROMIDE AND ALBUTEROL SULFATE 2.5; .5 MG/3ML; MG/3ML
1 SOLUTION RESPIRATORY (INHALATION)
Status: DISCONTINUED | OUTPATIENT
Start: 2023-11-03 | End: 2023-11-03 | Stop reason: HOSPADM

## 2023-11-03 RX ORDER — SODIUM CHLORIDE 9 MG/ML
INJECTION, SOLUTION INTRAVENOUS CONTINUOUS
Status: DISCONTINUED | OUTPATIENT
Start: 2023-11-03 | End: 2023-11-03 | Stop reason: HOSPADM

## 2023-11-03 RX ORDER — BISACODYL 5 MG/1
5 TABLET, DELAYED RELEASE ORAL DAILY
Status: DISCONTINUED | OUTPATIENT
Start: 2023-11-03 | End: 2023-11-05 | Stop reason: HOSPADM

## 2023-11-03 RX ORDER — VANCOMYCIN HYDROCHLORIDE 500 MG/10ML
INJECTION, POWDER, LYOPHILIZED, FOR SOLUTION INTRAVENOUS PRN
Status: DISCONTINUED | OUTPATIENT
Start: 2023-11-03 | End: 2023-11-03 | Stop reason: HOSPADM

## 2023-11-03 RX ORDER — OXYCODONE HYDROCHLORIDE 10 MG/1
10 TABLET ORAL EVERY 4 HOURS PRN
Status: DISCONTINUED | OUTPATIENT
Start: 2023-11-03 | End: 2023-11-05 | Stop reason: HOSPADM

## 2023-11-03 RX ORDER — SODIUM CHLORIDE 9 MG/ML
INJECTION, SOLUTION INTRAVENOUS PRN
Status: DISCONTINUED | OUTPATIENT
Start: 2023-11-03 | End: 2023-11-03 | Stop reason: HOSPADM

## 2023-11-03 RX ORDER — ACETAMINOPHEN 325 MG/1
650 TABLET ORAL EVERY 6 HOURS
Status: DISCONTINUED | OUTPATIENT
Start: 2023-11-03 | End: 2023-11-05 | Stop reason: HOSPADM

## 2023-11-03 RX ORDER — DIPHENHYDRAMINE HYDROCHLORIDE 50 MG/ML
25 INJECTION INTRAMUSCULAR; INTRAVENOUS EVERY 6 HOURS PRN
Status: DISCONTINUED | OUTPATIENT
Start: 2023-11-03 | End: 2023-11-05 | Stop reason: HOSPADM

## 2023-11-03 RX ORDER — LIDOCAINE HYDROCHLORIDE AND EPINEPHRINE 5; 5 MG/ML; UG/ML
INJECTION, SOLUTION INFILTRATION; PERINEURAL PRN
Status: DISCONTINUED | OUTPATIENT
Start: 2023-11-03 | End: 2023-11-03 | Stop reason: HOSPADM

## 2023-11-03 RX ORDER — SODIUM CHLORIDE 0.9 % (FLUSH) 0.9 %
5-40 SYRINGE (ML) INJECTION PRN
Status: DISCONTINUED | OUTPATIENT
Start: 2023-11-03 | End: 2023-11-03 | Stop reason: HOSPADM

## 2023-11-03 RX ORDER — MORPHINE SULFATE 2 MG/ML
2 INJECTION, SOLUTION INTRAMUSCULAR; INTRAVENOUS
Status: DISCONTINUED | OUTPATIENT
Start: 2023-11-03 | End: 2023-11-05 | Stop reason: HOSPADM

## 2023-11-03 RX ORDER — ONDANSETRON 4 MG/1
4 TABLET, ORALLY DISINTEGRATING ORAL EVERY 8 HOURS PRN
Status: DISCONTINUED | OUTPATIENT
Start: 2023-11-03 | End: 2023-11-05 | Stop reason: HOSPADM

## 2023-11-03 RX ORDER — DEXAMETHASONE SODIUM PHOSPHATE 10 MG/ML
INJECTION INTRAMUSCULAR; INTRAVENOUS PRN
Status: DISCONTINUED | OUTPATIENT
Start: 2023-11-03 | End: 2023-11-03 | Stop reason: SDUPTHER

## 2023-11-03 RX ORDER — POLYETHYLENE GLYCOL 3350 17 G/17G
17 POWDER, FOR SOLUTION ORAL DAILY
Status: DISCONTINUED | OUTPATIENT
Start: 2023-11-03 | End: 2023-11-05 | Stop reason: HOSPADM

## 2023-11-03 RX ORDER — HYDROMORPHONE HYDROCHLORIDE 1 MG/ML
0.25 INJECTION, SOLUTION INTRAMUSCULAR; INTRAVENOUS; SUBCUTANEOUS EVERY 5 MIN PRN
Status: DISCONTINUED | OUTPATIENT
Start: 2023-11-03 | End: 2023-11-03 | Stop reason: HOSPADM

## 2023-11-03 RX ORDER — OXYCODONE HYDROCHLORIDE 5 MG/1
5 TABLET ORAL EVERY 4 HOURS PRN
Status: DISCONTINUED | OUTPATIENT
Start: 2023-11-03 | End: 2023-11-05 | Stop reason: HOSPADM

## 2023-11-03 RX ORDER — GABAPENTIN 300 MG/1
300 CAPSULE ORAL 3 TIMES DAILY
Status: DISCONTINUED | OUTPATIENT
Start: 2023-11-03 | End: 2023-11-05 | Stop reason: HOSPADM

## 2023-11-03 RX ORDER — DIAZEPAM 5 MG/1
5 TABLET ORAL EVERY 6 HOURS PRN
Status: DISCONTINUED | OUTPATIENT
Start: 2023-11-03 | End: 2023-11-05 | Stop reason: HOSPADM

## 2023-11-03 RX ORDER — HYDRALAZINE HYDROCHLORIDE 20 MG/ML
5 INJECTION INTRAMUSCULAR; INTRAVENOUS
Status: DISCONTINUED | OUTPATIENT
Start: 2023-11-03 | End: 2023-11-03 | Stop reason: HOSPADM

## 2023-11-03 RX ORDER — BUPIVACAINE HYDROCHLORIDE 2.5 MG/ML
INJECTION, SOLUTION EPIDURAL; INFILTRATION; INTRACAUDAL PRN
Status: DISCONTINUED | OUTPATIENT
Start: 2023-11-03 | End: 2023-11-03 | Stop reason: HOSPADM

## 2023-11-03 RX ORDER — PROPOFOL 10 MG/ML
INJECTION, EMULSION INTRAVENOUS PRN
Status: DISCONTINUED | OUTPATIENT
Start: 2023-11-03 | End: 2023-11-03 | Stop reason: SDUPTHER

## 2023-11-03 RX ORDER — DROPERIDOL 2.5 MG/ML
0.62 INJECTION, SOLUTION INTRAMUSCULAR; INTRAVENOUS
Status: DISCONTINUED | OUTPATIENT
Start: 2023-11-03 | End: 2023-11-03 | Stop reason: HOSPADM

## 2023-11-03 RX ORDER — FENTANYL CITRATE 50 UG/ML
INJECTION, SOLUTION INTRAMUSCULAR; INTRAVENOUS PRN
Status: DISCONTINUED | OUTPATIENT
Start: 2023-11-03 | End: 2023-11-03 | Stop reason: SDUPTHER

## 2023-11-03 RX ORDER — MIDAZOLAM HYDROCHLORIDE 1 MG/ML
INJECTION INTRAMUSCULAR; INTRAVENOUS PRN
Status: DISCONTINUED | OUTPATIENT
Start: 2023-11-03 | End: 2023-11-03 | Stop reason: SDUPTHER

## 2023-11-03 RX ORDER — MEPERIDINE HYDROCHLORIDE 25 MG/ML
12.5 INJECTION INTRAMUSCULAR; INTRAVENOUS; SUBCUTANEOUS EVERY 5 MIN PRN
Status: DISCONTINUED | OUTPATIENT
Start: 2023-11-03 | End: 2023-11-03 | Stop reason: HOSPADM

## 2023-11-03 RX ORDER — SODIUM CHLORIDE 9 MG/ML
INJECTION, SOLUTION INTRAVENOUS CONTINUOUS
Status: DISCONTINUED | OUTPATIENT
Start: 2023-11-03 | End: 2023-11-05 | Stop reason: HOSPADM

## 2023-11-03 RX ORDER — DIPHENHYDRAMINE HCL 25 MG
25 TABLET ORAL EVERY 6 HOURS PRN
Status: DISCONTINUED | OUTPATIENT
Start: 2023-11-03 | End: 2023-11-05 | Stop reason: HOSPADM

## 2023-11-03 RX ORDER — SODIUM CHLORIDE 9 MG/ML
INJECTION, SOLUTION INTRAVENOUS CONTINUOUS PRN
Status: DISCONTINUED | OUTPATIENT
Start: 2023-11-03 | End: 2023-11-03 | Stop reason: SDUPTHER

## 2023-11-03 RX ORDER — ACETAMINOPHEN 500 MG
1000 TABLET ORAL ONCE
Status: COMPLETED | OUTPATIENT
Start: 2023-11-03 | End: 2023-11-03

## 2023-11-03 RX ORDER — ENEMA 19; 7 G/133ML; G/133ML
1 ENEMA RECTAL DAILY PRN
Status: DISCONTINUED | OUTPATIENT
Start: 2023-11-03 | End: 2023-11-05 | Stop reason: HOSPADM

## 2023-11-03 RX ORDER — LABETALOL HYDROCHLORIDE 5 MG/ML
5 INJECTION, SOLUTION INTRAVENOUS
Status: DISCONTINUED | OUTPATIENT
Start: 2023-11-03 | End: 2023-11-03 | Stop reason: HOSPADM

## 2023-11-03 RX ORDER — HYDROMORPHONE HYDROCHLORIDE 1 MG/ML
0.5 INJECTION, SOLUTION INTRAMUSCULAR; INTRAVENOUS; SUBCUTANEOUS EVERY 5 MIN PRN
Status: COMPLETED | OUTPATIENT
Start: 2023-11-03 | End: 2023-11-03

## 2023-11-03 RX ORDER — MORPHINE SULFATE 4 MG/ML
4 INJECTION, SOLUTION INTRAMUSCULAR; INTRAVENOUS
Status: DISCONTINUED | OUTPATIENT
Start: 2023-11-03 | End: 2023-11-05 | Stop reason: HOSPADM

## 2023-11-03 RX ORDER — BISACODYL 10 MG
10 SUPPOSITORY, RECTAL RECTAL DAILY PRN
Status: DISCONTINUED | OUTPATIENT
Start: 2023-11-03 | End: 2023-11-05 | Stop reason: HOSPADM

## 2023-11-03 RX ORDER — ONDANSETRON 2 MG/ML
INJECTION INTRAMUSCULAR; INTRAVENOUS PRN
Status: DISCONTINUED | OUTPATIENT
Start: 2023-11-03 | End: 2023-11-03 | Stop reason: SDUPTHER

## 2023-11-03 RX ORDER — SODIUM CHLORIDE 9 MG/ML
INJECTION, SOLUTION INTRAVENOUS PRN
Status: DISCONTINUED | OUTPATIENT
Start: 2023-11-03 | End: 2023-11-05 | Stop reason: HOSPADM

## 2023-11-03 RX ORDER — SODIUM CHLORIDE 0.9 % (FLUSH) 0.9 %
5-40 SYRINGE (ML) INJECTION PRN
Status: DISCONTINUED | OUTPATIENT
Start: 2023-11-03 | End: 2023-11-05 | Stop reason: HOSPADM

## 2023-11-03 RX ORDER — SODIUM CHLORIDE 0.9 % (FLUSH) 0.9 %
5-40 SYRINGE (ML) INJECTION EVERY 12 HOURS SCHEDULED
Status: DISCONTINUED | OUTPATIENT
Start: 2023-11-03 | End: 2023-11-05 | Stop reason: HOSPADM

## 2023-11-03 RX ORDER — ROCURONIUM BROMIDE 10 MG/ML
INJECTION, SOLUTION INTRAVENOUS PRN
Status: DISCONTINUED | OUTPATIENT
Start: 2023-11-03 | End: 2023-11-03 | Stop reason: SDUPTHER

## 2023-11-03 RX ORDER — ONDANSETRON 2 MG/ML
4 INJECTION INTRAMUSCULAR; INTRAVENOUS EVERY 6 HOURS PRN
Status: DISCONTINUED | OUTPATIENT
Start: 2023-11-03 | End: 2023-11-05 | Stop reason: HOSPADM

## 2023-11-03 RX ORDER — MIDAZOLAM HYDROCHLORIDE 2 MG/2ML
2 INJECTION, SOLUTION INTRAMUSCULAR; INTRAVENOUS
Status: DISCONTINUED | OUTPATIENT
Start: 2023-11-03 | End: 2023-11-03 | Stop reason: HOSPADM

## 2023-11-03 RX ORDER — ACETAMINOPHEN 325 MG/1
650 TABLET ORAL
Status: DISCONTINUED | OUTPATIENT
Start: 2023-11-03 | End: 2023-11-03 | Stop reason: HOSPADM

## 2023-11-03 RX ORDER — ONDANSETRON 2 MG/ML
4 INJECTION INTRAMUSCULAR; INTRAVENOUS
Status: DISCONTINUED | OUTPATIENT
Start: 2023-11-03 | End: 2023-11-03 | Stop reason: HOSPADM

## 2023-11-03 RX ORDER — DIPHENHYDRAMINE HYDROCHLORIDE 50 MG/ML
12.5 INJECTION INTRAMUSCULAR; INTRAVENOUS
Status: DISCONTINUED | OUTPATIENT
Start: 2023-11-03 | End: 2023-11-03 | Stop reason: HOSPADM

## 2023-11-03 RX ADMIN — HYDROMORPHONE HYDROCHLORIDE 0.5 MG: 1 INJECTION, SOLUTION INTRAMUSCULAR; INTRAVENOUS; SUBCUTANEOUS at 10:09

## 2023-11-03 RX ADMIN — DOCUSATE SODIUM 200 MG: 100 CAPSULE, LIQUID FILLED ORAL at 11:27

## 2023-11-03 RX ADMIN — ACETAMINOPHEN 650 MG: 325 TABLET ORAL at 16:27

## 2023-11-03 RX ADMIN — HYDROMORPHONE HYDROCHLORIDE 0.5 MG: 1 INJECTION, SOLUTION INTRAMUSCULAR; INTRAVENOUS; SUBCUTANEOUS at 09:52

## 2023-11-03 RX ADMIN — ONDANSETRON 4 MG: 2 INJECTION INTRAMUSCULAR; INTRAVENOUS at 09:15

## 2023-11-03 RX ADMIN — WATER 2000 MG: 1 INJECTION INTRAMUSCULAR; INTRAVENOUS; SUBCUTANEOUS at 16:27

## 2023-11-03 RX ADMIN — SUGAMMADEX 200 MG: 100 INJECTION, SOLUTION INTRAVENOUS at 09:15

## 2023-11-03 RX ADMIN — CEFAZOLIN 2000 MG: 2 INJECTION, POWDER, FOR SOLUTION INTRAMUSCULAR; INTRAVENOUS at 07:00

## 2023-11-03 RX ADMIN — PROPOFOL 60 MG: 10 INJECTION, EMULSION INTRAVENOUS at 08:10

## 2023-11-03 RX ADMIN — FENTANYL CITRATE 150 MCG: 0.05 INJECTION, SOLUTION INTRAMUSCULAR; INTRAVENOUS at 06:46

## 2023-11-03 RX ADMIN — OXYCODONE 5 MG: 5 TABLET ORAL at 11:49

## 2023-11-03 RX ADMIN — SODIUM CHLORIDE, PRESERVATIVE FREE 10 ML: 5 INJECTION INTRAVENOUS at 11:28

## 2023-11-03 RX ADMIN — GABAPENTIN 300 MG: 300 CAPSULE ORAL at 14:17

## 2023-11-03 RX ADMIN — MORPHINE SULFATE 4 MG: 4 INJECTION, SOLUTION INTRAMUSCULAR; INTRAVENOUS at 23:36

## 2023-11-03 RX ADMIN — DIAZEPAM 5 MG: 5 TABLET ORAL at 23:36

## 2023-11-03 RX ADMIN — ACETAMINOPHEN 650 MG: 325 TABLET ORAL at 23:35

## 2023-11-03 RX ADMIN — SODIUM CHLORIDE: 9 INJECTION, SOLUTION INTRAVENOUS at 05:35

## 2023-11-03 RX ADMIN — SODIUM CHLORIDE: 9 INJECTION, SOLUTION INTRAVENOUS at 06:25

## 2023-11-03 RX ADMIN — MORPHINE SULFATE 4 MG: 4 INJECTION, SOLUTION INTRAMUSCULAR; INTRAVENOUS at 18:28

## 2023-11-03 RX ADMIN — BISACODYL 5 MG: 5 TABLET, COATED ORAL at 11:27

## 2023-11-03 RX ADMIN — DEXAMETHASONE SODIUM PHOSPHATE 10 MG: 10 INJECTION INTRAMUSCULAR; INTRAVENOUS at 06:54

## 2023-11-03 RX ADMIN — HYDROMORPHONE HYDROCHLORIDE 0.5 MG: 1 INJECTION, SOLUTION INTRAMUSCULAR; INTRAVENOUS; SUBCUTANEOUS at 09:47

## 2023-11-03 RX ADMIN — LIDOCAINE HYDROCHLORIDE 80 MG: 20 INJECTION, SOLUTION INTRAVENOUS at 06:46

## 2023-11-03 RX ADMIN — ROCURONIUM BROMIDE 50 MG: 10 INJECTION, SOLUTION INTRAVENOUS at 06:46

## 2023-11-03 RX ADMIN — OXYCODONE HYDROCHLORIDE 10 MG: 10 TABLET ORAL at 16:27

## 2023-11-03 RX ADMIN — MIDAZOLAM 1 MG: 1 INJECTION INTRAMUSCULAR; INTRAVENOUS at 06:35

## 2023-11-03 RX ADMIN — ACETAMINOPHEN 650 MG: 325 TABLET ORAL at 11:27

## 2023-11-03 RX ADMIN — HYDROMORPHONE HYDROCHLORIDE 0.5 MG: 1 INJECTION, SOLUTION INTRAMUSCULAR; INTRAVENOUS; SUBCUTANEOUS at 10:04

## 2023-11-03 RX ADMIN — SODIUM CHLORIDE, PRESERVATIVE FREE 10 ML: 5 INJECTION INTRAVENOUS at 20:55

## 2023-11-03 RX ADMIN — OXYCODONE HYDROCHLORIDE 10 MG: 10 TABLET ORAL at 20:54

## 2023-11-03 RX ADMIN — GABAPENTIN 300 MG: 300 CAPSULE ORAL at 20:54

## 2023-11-03 RX ADMIN — ACETAMINOPHEN 1000 MG: 500 TABLET ORAL at 05:36

## 2023-11-03 RX ADMIN — DOCUSATE SODIUM 200 MG: 100 CAPSULE, LIQUID FILLED ORAL at 20:54

## 2023-11-03 RX ADMIN — PROPOFOL 170 MG: 10 INJECTION, EMULSION INTRAVENOUS at 06:46

## 2023-11-03 RX ADMIN — MIDAZOLAM 1 MG: 1 INJECTION INTRAMUSCULAR; INTRAVENOUS at 06:31

## 2023-11-03 RX ADMIN — SENNOSIDES AND DOCUSATE SODIUM 1 TABLET: 8.6; 5 TABLET ORAL at 20:55

## 2023-11-03 RX ADMIN — DIAZEPAM 5 MG: 5 TABLET ORAL at 16:27

## 2023-11-03 RX ADMIN — SENNOSIDES AND DOCUSATE SODIUM 1 TABLET: 8.6; 5 TABLET ORAL at 11:26

## 2023-11-03 RX ADMIN — FENTANYL CITRATE 50 MCG: 0.05 INJECTION, SOLUTION INTRAMUSCULAR; INTRAVENOUS at 07:31

## 2023-11-03 RX ADMIN — SODIUM CHLORIDE: 9 INJECTION, SOLUTION INTRAVENOUS at 11:26

## 2023-11-03 RX ADMIN — POLYETHYLENE GLYCOL 3350 17 G: 17 POWDER, FOR SOLUTION ORAL at 11:26

## 2023-11-03 RX ADMIN — WATER 2000 MG: 1 INJECTION INTRAMUSCULAR; INTRAVENOUS; SUBCUTANEOUS at 23:37

## 2023-11-03 ASSESSMENT — PAIN DESCRIPTION - ORIENTATION
ORIENTATION: RIGHT;LEFT;LOWER;POSTERIOR
ORIENTATION: RIGHT
ORIENTATION: LOWER
ORIENTATION: LEFT;RIGHT
ORIENTATION: RIGHT;LEFT
ORIENTATION: RIGHT;LEFT

## 2023-11-03 ASSESSMENT — PAIN DESCRIPTION - ONSET
ONSET: ON-GOING

## 2023-11-03 ASSESSMENT — PAIN DESCRIPTION - DESCRIPTORS
DESCRIPTORS: ACHING;BURNING;CRAMPING
DESCRIPTORS: ACHING;DISCOMFORT;DULL;SPASM
DESCRIPTORS: ACHING;BURNING;CRAMPING
DESCRIPTORS: ACHING;NUMBNESS;DISCOMFORT
DESCRIPTORS: ACHING;BURNING;CRAMPING
DESCRIPTORS: ACHING;DISCOMFORT;SORE;TENDER
DESCRIPTORS: ACHING;DISCOMFORT;SORE
DESCRIPTORS: ACHING;DISCOMFORT;SORE

## 2023-11-03 ASSESSMENT — LIFESTYLE VARIABLES: SMOKING_STATUS: 0

## 2023-11-03 ASSESSMENT — PAIN DESCRIPTION - PAIN TYPE
TYPE: SURGICAL PAIN

## 2023-11-03 ASSESSMENT — PAIN DESCRIPTION - LOCATION
LOCATION: BACK
LOCATION: BACK
LOCATION: BACK;HIP
LOCATION: BACK
LOCATION: BACK;HIP;LEG
LOCATION: BACK

## 2023-11-03 ASSESSMENT — PAIN - FUNCTIONAL ASSESSMENT
PAIN_FUNCTIONAL_ASSESSMENT: PREVENTS OR INTERFERES SOME ACTIVE ACTIVITIES AND ADLS
PAIN_FUNCTIONAL_ASSESSMENT: PREVENTS OR INTERFERES SOME ACTIVE ACTIVITIES AND ADLS
PAIN_FUNCTIONAL_ASSESSMENT: ACTIVITIES ARE NOT PREVENTED
PAIN_FUNCTIONAL_ASSESSMENT: PREVENTS OR INTERFERES WITH MANY ACTIVE NOT PASSIVE ACTIVITIES
PAIN_FUNCTIONAL_ASSESSMENT: PREVENTS OR INTERFERES SOME ACTIVE ACTIVITIES AND ADLS
PAIN_FUNCTIONAL_ASSESSMENT: ACTIVITIES ARE NOT PREVENTED
PAIN_FUNCTIONAL_ASSESSMENT: 0-10

## 2023-11-03 ASSESSMENT — PAIN DESCRIPTION - FREQUENCY
FREQUENCY: CONTINUOUS

## 2023-11-03 ASSESSMENT — PAIN SCALES - GENERAL
PAINLEVEL_OUTOF10: 8
PAINLEVEL_OUTOF10: 5
PAINLEVEL_OUTOF10: 4
PAINLEVEL_OUTOF10: 0
PAINLEVEL_OUTOF10: 5
PAINLEVEL_OUTOF10: 0
PAINLEVEL_OUTOF10: 0
PAINLEVEL_OUTOF10: 7
PAINLEVEL_OUTOF10: 7
PAINLEVEL_OUTOF10: 4
PAINLEVEL_OUTOF10: 7
PAINLEVEL_OUTOF10: 7
PAINLEVEL_OUTOF10: 3
PAINLEVEL_OUTOF10: 4
PAINLEVEL_OUTOF10: 7
PAINLEVEL_OUTOF10: 4

## 2023-11-03 ASSESSMENT — PAIN SCALES - WONG BAKER: WONGBAKER_NUMERICALRESPONSE: 0

## 2023-11-03 NOTE — H&P
I have examined the patient and reviewed the H and P and no changes are noted. The  right leg is worse as always.     Diann Felix MD

## 2023-11-03 NOTE — ANESTHESIA POSTPROCEDURE EVALUATION
Department of Anesthesiology  Postprocedure Note    Patient: April D Jovi  MRN: [id number]  YOB: 1985  Date of evaluation: 11/3/2023      Procedure Summary     Date: 11/03/23 Room / Location: [facility] OR 06 / [facility]    Anesthesia Start: 3245 Anesthesia Stop: 8279    Procedure: Lumbar Five to Sacral One Posterior Lumbar Interior Body Fusion (Spine Lumbar) Diagnosis:       Lumbar disc herniation with radiculopathy      (Lumbar disc herniation with radiculopathy [M51.16])    Surgeons: Edy Lipscomb MD Responsible Provider:     Anesthesia Type: General ASA Status: 2          Anesthesia Type: General    Trish Phase I: Trish Score: 8    Trish Phase II:        Anesthesia Post Evaluation    Patient location during evaluation: PACU  Patient participation: complete - patient participated  Level of consciousness: awake and alert  Airway patency: patent  Nausea & Vomiting: no nausea and no vomiting  Complications: no  Cardiovascular status: blood pressure returned to baseline and hemodynamically stable  Respiratory status: acceptable and spontaneous ventilation  Hydration status: euvolemic  Multimodal analgesia pain management approach  Pain management: adequate

## 2023-11-03 NOTE — ANESTHESIA PRE PROCEDURE
notes reviewed no history of anesthetic complications:   Airway: Mallampati: II  TM distance: >3 FB   Neck ROM: full  Mouth opening: > = 3 FB   Dental: normal exam     Comment: Denies missing, loose, or chipped teeth    Pulmonary: breath sounds clear to auscultation      (-) not a current smoker                          ROS comment: Former smoker, 20 pack years, quit in 9/23   Cardiovascular:Negative CV ROS          ECG reviewed  Rhythm: regular  Rate: normal           Beta Blocker:  Not on Beta Blocker         Neuro/Psych:   (+) neuromuscular disease (Lumbar disc herniation with radiculopathy):, psychiatric history (OCD):             ROS comment: DDD (degenerative disc disease), lumbar  Displacement of lumbar intervertebral disc without myelopathy  Lumbar radicular pain  Chronic pain      Right leg is numb most of the time from hip down- and tingling present GI/Hepatic/Renal: Neg GI/Hepatic/Renal ROS            Endo/Other:    (+) : arthritis:., .                  ROS comment: Senile osteopenia  Sacroilitis Abdominal:             Vascular: negative vascular ROS. Other Findings:      EKG 10/31/23:  Normal sinus rhythm with sinus arrhythmia  Normal ECG  When compared with ECG of 26-APR-2023 10:02,  No significant change was found  Confirmed by Evelyn Garcia ([id number]) on 10/31/2023 6:11:30 PM    CHEST XRAY 10/31/23: The lungs are without acute focal process. There is no effusion or  pneumothorax. The cardiomediastinal silhouette is without acute process. The  osseous structures are without acute process. Anesthesia Plan      general     ASA 2       Induction: intravenous. continuous noninvasive hemodynamic monitor and BIS  MIPS: Prophylactic antiemetics administered. Anesthetic plan and risks discussed with patient. Use of blood products discussed with patient whom consented to blood products. Plan discussed with CRNA and attending.                     Renetta Edwards RN   11/3/2023

## 2023-11-03 NOTE — BRIEF OP NOTE
Brief Postoperative Note      Patient: April D Jovi  YOB: 1985  MRN: [id number]    Date of Procedure: 11/3/2023    Pre-Op Diagnosis Codes:     * Lumbar disc herniation with radiculopathy [M51.16]    Post-Op Diagnosis: Same       Procedure(s):  Lumbar Five to Sacral One Posterior Lumbar Interior Body Fusion    Surgeon(s):  Vianey Macario MD    Assistant:  Physician Assistant: Syed Austin PA-C    Anesthesia: General    Estimated Blood Loss (mL): less than 393     Complications: None    Specimens:   ID Type Source Tests Collected by Time Destination   A : Lumbar Disc Tissue Tissue SURGICAL PATHOLOGY Vianey Macario MD 11/3/2023 0857        Implants:  Implant Name Type Inv. Item Serial No.  Lot No. LRB No. Used Action   VIASORB STRP [id number] - [id number]  VIASORB STRP [id number] [facility]  N/A 1 Implanted   SYSTEM GRFT DEL CONCL TIP INSTAFILL - [id number]  SYSTEM GRFT DEL CONCL TIP INSTAFILL  [facility] [id number] N/A 1 Implanted   CARTRIDGE GRFT DEL 5 CC DBM INSTAFILL - [id number]  CARTRIDGE GRFT DEL 5 CC DBM INSTAFILL [id number] [facility]  N/A 1 Implanted   SPACER SPNL 8 DEG [id number] MM SABLE - [id number]  SPACER SPNL 8 DEG [id number] MM SABLE  [facility] [id number] N/A 1 Implanted   SPACER SPNL 8 DEG [id number] MM SABLE - [id number]  SPACER SPNL 8 DEG [id number] MM SABLE  [facility] [id number] N/A 1 Implanted   SCREW SPNL L35MM [id number] THORLUM POLYAX NONCANNULATED THRD - [id number]  SCREW SPNL L35MM [id number] THORLUM POLYAX NONCANNULATED THRD  [facility]  N/A 4 Implanted   CAP SPNL THORLUM SABINE THRD CREO - [id number]  CAP SPNL THORLUM SABINE THRD CREO  [facility]  N/A 4 Implanted   STEPHANIE SPNL L45MM DIA5. 5MM ANT POST FACET JT TI ALLY SMOOTH - [id number]  STEPHANIE SPNL L45MM DIA5. 5MM ANT POST FACET JT TI ALLY SMOOTH  [id number]  N/A 1 Implanted   STEPHANIE SPNL L40MM DIA5. 5MM TI CONSUELO ERNST CREO - [id number]  STEPHANIE

## 2023-11-04 LAB
ANION GAP SERPL CALCULATED.3IONS-SCNC: 7 MMOL/L (ref 7–16)
BUN SERPL-MCNC: 5 MG/DL (ref 6–20)
CALCIUM SERPL-MCNC: 8.6 MG/DL (ref 8.6–10.2)
CHLORIDE SERPL-SCNC: 105 MMOL/L (ref 98–107)
CO2 SERPL-SCNC: 27 MMOL/L (ref 22–29)
CREAT SERPL-MCNC: 0.6 MG/DL (ref 0.5–1)
ERYTHROCYTE [DISTWIDTH] IN BLOOD BY AUTOMATED COUNT: 11.9 % (ref 11.5–15)
GFR SERPL CREATININE-BSD FRML MDRD: >60 ML/MIN/1.73M2
GLUCOSE SERPL-MCNC: 99 MG/DL (ref 74–99)
HCT VFR BLD AUTO: 33.5 % (ref 34–48)
HGB BLD-MCNC: 11.1 G/DL (ref 11.5–15.5)
MCH RBC QN AUTO: 32.2 PG (ref 26–35)
MCHC RBC AUTO-ENTMCNC: 33.1 G/DL (ref 32–34.5)
MCV RBC AUTO: 97.1 FL (ref 80–99.9)
PLATELET # BLD AUTO: 240 K/UL (ref 130–450)
PMV BLD AUTO: 10 FL (ref 7–12)
POTASSIUM SERPL-SCNC: 3.9 MMOL/L (ref 3.5–5)
RBC # BLD AUTO: 3.45 M/UL (ref 3.5–5.5)
SODIUM SERPL-SCNC: 139 MMOL/L (ref 132–146)
WBC OTHER # BLD: 14.1 K/UL (ref 4.5–11.5)

## 2023-11-04 PROCEDURE — 6370000000 HC RX 637 (ALT 250 FOR IP): Performed by: NEUROLOGICAL SURGERY

## 2023-11-04 PROCEDURE — 6360000002 HC RX W HCPCS: Performed by: NEUROLOGICAL SURGERY

## 2023-11-04 PROCEDURE — 96376 TX/PRO/DX INJ SAME DRUG ADON: CPT

## 2023-11-04 PROCEDURE — 80048 BASIC METABOLIC PNL TOTAL CA: CPT

## 2023-11-04 PROCEDURE — 2580000003 HC RX 258: Performed by: NEUROLOGICAL SURGERY

## 2023-11-04 PROCEDURE — 96374 THER/PROPH/DIAG INJ IV PUSH: CPT

## 2023-11-04 PROCEDURE — 85027 COMPLETE CBC AUTOMATED: CPT

## 2023-11-04 PROCEDURE — G0378 HOSPITAL OBSERVATION PER HR: HCPCS

## 2023-11-04 PROCEDURE — 36415 COLL VENOUS BLD VENIPUNCTURE: CPT

## 2023-11-04 RX ORDER — PROMETHAZINE HYDROCHLORIDE 25 MG/ML
6.25 INJECTION, SOLUTION INTRAMUSCULAR; INTRAVENOUS EVERY 6 HOURS PRN
Status: DISCONTINUED | OUTPATIENT
Start: 2023-11-04 | End: 2023-11-05 | Stop reason: HOSPADM

## 2023-11-04 RX ORDER — PROCHLORPERAZINE EDISYLATE 5 MG/ML
10 INJECTION INTRAMUSCULAR; INTRAVENOUS EVERY 6 HOURS PRN
Status: DISCONTINUED | OUTPATIENT
Start: 2023-11-04 | End: 2023-11-05 | Stop reason: HOSPADM

## 2023-11-04 RX ADMIN — SENNOSIDES AND DOCUSATE SODIUM 1 TABLET: 8.6; 5 TABLET ORAL at 20:03

## 2023-11-04 RX ADMIN — BISACODYL 5 MG: 5 TABLET, COATED ORAL at 09:18

## 2023-11-04 RX ADMIN — DIAZEPAM 5 MG: 5 TABLET ORAL at 05:31

## 2023-11-04 RX ADMIN — MORPHINE SULFATE 2 MG: 2 INJECTION, SOLUTION INTRAMUSCULAR; INTRAVENOUS at 11:59

## 2023-11-04 RX ADMIN — WATER 2000 MG: 1 INJECTION INTRAMUSCULAR; INTRAVENOUS; SUBCUTANEOUS at 23:55

## 2023-11-04 RX ADMIN — GABAPENTIN 300 MG: 300 CAPSULE ORAL at 14:38

## 2023-11-04 RX ADMIN — OXYCODONE HYDROCHLORIDE 10 MG: 10 TABLET ORAL at 14:38

## 2023-11-04 RX ADMIN — ACETAMINOPHEN 650 MG: 325 TABLET ORAL at 11:59

## 2023-11-04 RX ADMIN — DOCUSATE SODIUM 200 MG: 100 CAPSULE, LIQUID FILLED ORAL at 20:03

## 2023-11-04 RX ADMIN — WATER 2000 MG: 1 INJECTION INTRAMUSCULAR; INTRAVENOUS; SUBCUTANEOUS at 15:54

## 2023-11-04 RX ADMIN — WATER 2000 MG: 1 INJECTION INTRAMUSCULAR; INTRAVENOUS; SUBCUTANEOUS at 09:18

## 2023-11-04 RX ADMIN — DOCUSATE SODIUM 200 MG: 100 CAPSULE, LIQUID FILLED ORAL at 09:18

## 2023-11-04 RX ADMIN — OXYCODONE HYDROCHLORIDE 10 MG: 10 TABLET ORAL at 02:31

## 2023-11-04 RX ADMIN — OXYCODONE 5 MG: 5 TABLET ORAL at 18:26

## 2023-11-04 RX ADMIN — GABAPENTIN 300 MG: 300 CAPSULE ORAL at 20:03

## 2023-11-04 RX ADMIN — ACETAMINOPHEN 650 MG: 325 TABLET ORAL at 05:31

## 2023-11-04 RX ADMIN — GABAPENTIN 300 MG: 300 CAPSULE ORAL at 09:19

## 2023-11-04 RX ADMIN — MORPHINE SULFATE 4 MG: 4 INJECTION, SOLUTION INTRAMUSCULAR; INTRAVENOUS at 20:04

## 2023-11-04 RX ADMIN — SENNOSIDES AND DOCUSATE SODIUM 1 TABLET: 8.6; 5 TABLET ORAL at 09:19

## 2023-11-04 RX ADMIN — SODIUM CHLORIDE, PRESERVATIVE FREE 10 ML: 5 INJECTION INTRAVENOUS at 09:20

## 2023-11-04 RX ADMIN — ACETAMINOPHEN 650 MG: 325 TABLET ORAL at 18:25

## 2023-11-04 RX ADMIN — OXYCODONE 5 MG: 5 TABLET ORAL at 09:19

## 2023-11-04 RX ADMIN — POLYETHYLENE GLYCOL 3350 17 G: 17 POWDER, FOR SOLUTION ORAL at 09:18

## 2023-11-04 RX ADMIN — SODIUM CHLORIDE: 9 INJECTION, SOLUTION INTRAVENOUS at 19:57

## 2023-11-04 RX ADMIN — SODIUM CHLORIDE, PRESERVATIVE FREE 10 ML: 5 INJECTION INTRAVENOUS at 20:04

## 2023-11-04 RX ADMIN — MORPHINE SULFATE 4 MG: 4 INJECTION, SOLUTION INTRAMUSCULAR; INTRAVENOUS at 05:31

## 2023-11-04 ASSESSMENT — PAIN DESCRIPTION - LOCATION
LOCATION: BACK

## 2023-11-04 ASSESSMENT — PAIN DESCRIPTION - ORIENTATION
ORIENTATION: MID;LOWER
ORIENTATION: RIGHT;LEFT
ORIENTATION: MID
ORIENTATION: MID;LOWER
ORIENTATION: MID;LOWER
ORIENTATION: RIGHT;LEFT
ORIENTATION: MID;LOWER

## 2023-11-04 ASSESSMENT — PAIN SCALES - GENERAL
PAINLEVEL_OUTOF10: 3
PAINLEVEL_OUTOF10: 6
PAINLEVEL_OUTOF10: 6
PAINLEVEL_OUTOF10: 5
PAINLEVEL_OUTOF10: 9
PAINLEVEL_OUTOF10: 3
PAINLEVEL_OUTOF10: 3
PAINLEVEL_OUTOF10: 5
PAINLEVEL_OUTOF10: 9
PAINLEVEL_OUTOF10: 2
PAINLEVEL_OUTOF10: 8

## 2023-11-04 ASSESSMENT — PAIN DESCRIPTION - DESCRIPTORS
DESCRIPTORS: SHARP;SHOOTING;SORE
DESCRIPTORS: STABBING
DESCRIPTORS: ACHING;DISCOMFORT;SORE
DESCRIPTORS: SHARP
DESCRIPTORS: STABBING
DESCRIPTORS: ACHING
DESCRIPTORS: ACHING;DISCOMFORT;SORE

## 2023-11-04 ASSESSMENT — PAIN DESCRIPTION - PAIN TYPE
TYPE: SURGICAL PAIN
TYPE: SURGICAL PAIN

## 2023-11-04 ASSESSMENT — PAIN - FUNCTIONAL ASSESSMENT
PAIN_FUNCTIONAL_ASSESSMENT: ACTIVITIES ARE NOT PREVENTED
PAIN_FUNCTIONAL_ASSESSMENT: PREVENTS OR INTERFERES WITH ALL ACTIVE AND SOME PASSIVE ACTIVITIES
PAIN_FUNCTIONAL_ASSESSMENT: PREVENTS OR INTERFERES SOME ACTIVE ACTIVITIES AND ADLS

## 2023-11-04 ASSESSMENT — PAIN DESCRIPTION - FREQUENCY
FREQUENCY: CONTINUOUS
FREQUENCY: CONTINUOUS

## 2023-11-04 ASSESSMENT — PAIN DESCRIPTION - ONSET
ONSET: ON-GOING
ONSET: ON-GOING

## 2023-11-04 NOTE — PLAN OF CARE
Problem: Pain  Goal: Verbalizes/displays adequate comfort level or baseline comfort level  Outcome: Progressing  Flowsheets (Taken 11/4/2023 1344)  Verbalizes/displays adequate comfort level or baseline comfort level:   Encourage patient to monitor pain and request assistance   Assess pain using appropriate pain scale   Administer analgesics based on type and severity of pain and evaluate response   Implement non-pharmacological measures as appropriate and evaluate response   Consider cultural and social influences on pain and pain management   Notify Licensed Independent Practitioner if interventions unsuccessful or patient reports new pain     Problem: ABCDS Injury Assessment  Goal: Absence of physical injury  Outcome: Progressing  Flowsheets (Taken 11/4/2023 1344)  Absence of Physical Injury: Implement safety measures based on patient assessment     Problem: Safety - Adult  Goal: Free from fall injury  Outcome: Progressing  Flowsheets (Taken 11/4/2023 1344)  Free From Fall Injury:   Instruct family/caregiver on patient safety   Based on caregiver fall risk screen, instruct family/caregiver to ask for assistance with transferring infant if caregiver noted to have fall risk factors

## 2023-11-04 NOTE — OP NOTE
[address], [address] [place] [address]                                OPERATIVE REPORT    PATIENT NAME: Kapil Vasquez                      :        1985  MED REC NO:   [id number]                            ROOM:       8210  ACCOUNT NO:   [de-identified]                           ADMIT DATE: 2023  PROVIDER:     Rosibel Echevarria MD    DATE OF PROCEDURE:  2023    PREOPERATIVE DIAGNOSIS:  Recurrent L5-S1 herniated nucleus pulposus. POSTOPERATIVE DIAGNOSIS:  Recurrent L5-S1 herniated nucleus pulposus. OPERATIVE PROCEDURES:  1.  Bilateral segmental arthrodesis and fusion at L5-S1 with use of  bilateral L5 and S1 pedicle screws with use of locally harvested  autograft plus allograft in the form of BioZorb strips for  posterolateral fusion at L5-S1.  2.  A 360-degree fusion with posterior lumbar interbody fusion at L5-S1  with use of bilateral Globus Sable expandable cages packed with  allograft in the form of XEMPLIFI putty. 3.  Bilateral L5 and S1 laminectomy, bilateral L5 and S1 medial  facetectomy, bilateral L5 and S1 foraminotomy, and bilateral L5 and S1  diskectomy. 4.  Use of O-arm assisted navigation placement of pedicle screws. 5.  Use of free-running EMG to test pedicle screw head. 6.  Use of intraoperative fluoroscopy interpreted by myself, the  surgeon. 7.  AS modifier for Aleksandr Pride PA-C, who assisted with primary  exposure and primary closure. SURGEON:  Rosibel Echevarria MD    ASSISTANT:  Aleksandr Pride PA-C    ANESTHESIA:  Generalized endotracheal anesthesia. COMPLICATIONS:  None. ESTIMATED BLOOD LOSS:  100 mL. SPECIMEN:  Disk. OPERATIVE INDICATIONS:  The patient is a 71-year-old lady, who presented  to the office complaining of back pain that radiating down her right  leg. She previously had a L5-S1 diskectomy and she presented with  recurrent disk herniation.   She had failed conservative therapy

## 2023-11-05 VITALS
TEMPERATURE: 98.7 F | OXYGEN SATURATION: 97 % | WEIGHT: 132 LBS | RESPIRATION RATE: 18 BRPM | BODY MASS INDEX: 23.39 KG/M2 | HEIGHT: 63 IN | SYSTOLIC BLOOD PRESSURE: 104 MMHG | HEART RATE: 84 BPM | DIASTOLIC BLOOD PRESSURE: 50 MMHG

## 2023-11-05 LAB
COTININE: 25 NG/ML
NICOTINE: <5 NG/ML

## 2023-11-05 PROCEDURE — 6370000000 HC RX 637 (ALT 250 FOR IP): Performed by: NEUROLOGICAL SURGERY

## 2023-11-05 PROCEDURE — 2580000003 HC RX 258: Performed by: NEUROLOGICAL SURGERY

## 2023-11-05 PROCEDURE — 6360000002 HC RX W HCPCS: Performed by: NEUROLOGICAL SURGERY

## 2023-11-05 PROCEDURE — G0378 HOSPITAL OBSERVATION PER HR: HCPCS

## 2023-11-05 PROCEDURE — 51702 INSERT TEMP BLADDER CATH: CPT

## 2023-11-05 RX ORDER — PSEUDOEPHEDRINE HCL 30 MG
200 TABLET ORAL 2 TIMES DAILY
Qty: 60 CAPSULE | Refills: 0 | Status: SHIPPED | OUTPATIENT
Start: 2023-11-05

## 2023-11-05 RX ORDER — OXYCODONE HYDROCHLORIDE 5 MG/1
5 TABLET ORAL EVERY 4 HOURS PRN
Qty: 42 TABLET | Refills: 0 | Status: SHIPPED | OUTPATIENT
Start: 2023-11-05 | End: 2023-11-12

## 2023-11-05 RX ORDER — DIAZEPAM 5 MG/1
5 TABLET ORAL EVERY 6 HOURS PRN
Qty: 40 TABLET | Refills: 0 | Status: SHIPPED | OUTPATIENT
Start: 2023-11-05 | End: 2023-11-15

## 2023-11-05 RX ADMIN — ACETAMINOPHEN 650 MG: 325 TABLET ORAL at 14:41

## 2023-11-05 RX ADMIN — POLYETHYLENE GLYCOL 3350 17 G: 17 POWDER, FOR SOLUTION ORAL at 09:14

## 2023-11-05 RX ADMIN — DOCUSATE SODIUM 200 MG: 100 CAPSULE, LIQUID FILLED ORAL at 09:11

## 2023-11-05 RX ADMIN — SENNOSIDES AND DOCUSATE SODIUM 1 TABLET: 8.6; 5 TABLET ORAL at 09:13

## 2023-11-05 RX ADMIN — BISACODYL 5 MG: 5 TABLET, COATED ORAL at 09:14

## 2023-11-05 RX ADMIN — OXYCODONE HYDROCHLORIDE 10 MG: 10 TABLET ORAL at 00:03

## 2023-11-05 RX ADMIN — OXYCODONE 5 MG: 5 TABLET ORAL at 14:38

## 2023-11-05 RX ADMIN — SODIUM CHLORIDE, PRESERVATIVE FREE 10 ML: 5 INJECTION INTRAVENOUS at 09:27

## 2023-11-05 RX ADMIN — ACETAMINOPHEN 650 MG: 325 TABLET ORAL at 04:25

## 2023-11-05 RX ADMIN — WATER 2000 MG: 1 INJECTION INTRAMUSCULAR; INTRAVENOUS; SUBCUTANEOUS at 09:14

## 2023-11-05 RX ADMIN — GABAPENTIN 300 MG: 300 CAPSULE ORAL at 14:38

## 2023-11-05 RX ADMIN — OXYCODONE HYDROCHLORIDE 10 MG: 10 TABLET ORAL at 09:11

## 2023-11-05 RX ADMIN — GABAPENTIN 300 MG: 300 CAPSULE ORAL at 09:13

## 2023-11-05 RX ADMIN — OXYCODONE 5 MG: 5 TABLET ORAL at 04:25

## 2023-11-05 ASSESSMENT — PAIN DESCRIPTION - LOCATION
LOCATION: BACK

## 2023-11-05 ASSESSMENT — PAIN DESCRIPTION - ONSET
ONSET: ON-GOING

## 2023-11-05 ASSESSMENT — PAIN DESCRIPTION - PAIN TYPE
TYPE: SURGICAL PAIN

## 2023-11-05 ASSESSMENT — PAIN - FUNCTIONAL ASSESSMENT
PAIN_FUNCTIONAL_ASSESSMENT: PREVENTS OR INTERFERES SOME ACTIVE ACTIVITIES AND ADLS

## 2023-11-05 ASSESSMENT — PAIN DESCRIPTION - FREQUENCY
FREQUENCY: CONTINUOUS

## 2023-11-05 ASSESSMENT — PAIN DESCRIPTION - ORIENTATION
ORIENTATION: MID

## 2023-11-05 ASSESSMENT — PAIN SCALES - GENERAL
PAINLEVEL_OUTOF10: 6
PAINLEVEL_OUTOF10: 10
PAINLEVEL_OUTOF10: 10
PAINLEVEL_OUTOF10: 8
PAINLEVEL_OUTOF10: 3
PAINLEVEL_OUTOF10: 3
PAINLEVEL_OUTOF10: 4

## 2023-11-05 ASSESSMENT — PAIN DESCRIPTION - DESCRIPTORS
DESCRIPTORS: SHARP;SHOOTING
DESCRIPTORS: SHARP;SHOOTING;SORE
DESCRIPTORS: SHARP;SHOOTING
DESCRIPTORS: SHARP;SHOOTING

## 2023-11-05 ASSESSMENT — PAIN SCALES - WONG BAKER
WONGBAKER_NUMERICALRESPONSE: 0
WONGBAKER_NUMERICALRESPONSE: 0

## 2023-11-05 NOTE — DISCHARGE INSTRUCTIONS
Keep incision clean and dry  Follow up in 2 weeks for staple removal  No lifting more than 10 lbs  Avoid extremes of bending and twisting  No drving

## 2023-11-05 NOTE — PLAN OF CARE
Problem: Discharge Planning  Goal: Discharge to home or other facility with appropriate resources  Outcome: Progressing     Problem: Pain  Goal: Verbalizes/displays adequate comfort level or baseline comfort level  11/5/2023 0217 by Reinier Mcdonough RN  Outcome: Progressing  11/4/2023 1344 by Augusta Zuñiga RN  Outcome: Progressing  Flowsheets (Taken 11/4/2023 1344)  Verbalizes/displays adequate comfort level or baseline comfort level:   Encourage patient to monitor pain and request assistance   Assess pain using appropriate pain scale   Administer analgesics based on type and severity of pain and evaluate response   Implement non-pharmacological measures as appropriate and evaluate response   Consider cultural and social influences on pain and pain management   Notify Licensed Independent Practitioner if interventions unsuccessful or patient reports new pain     Problem: ABCDS Injury Assessment  Goal: Absence of physical injury  11/5/2023 0217 by Reinier Mcdonough RN  Outcome: Progressing  11/4/2023 1344 by Augusta Zuñiga RN  Outcome: Progressing  Flowsheets (Taken 11/4/2023 1344)  Absence of Physical Injury: Implement safety measures based on patient assessment     Problem: Safety - Adult  Goal: Free from fall injury  11/5/2023 0217 by Reniier Mcdonough RN  Outcome: Progressing  11/4/2023 1344 by Augusta Zuñiga RN  Outcome: Progressing  Flowsheets (Taken 11/4/2023 1344)  Free From Fall Injury:   Instruct family/caregiver on patient safety   Based on caregiver fall risk screen, instruct family/caregiver to ask for assistance with transferring infant if caregiver noted to have fall risk factors

## 2023-11-05 NOTE — CARE COORDINATION
11/5/2023discharge order noted. Sw spoke with pt via phone. Ride at bedside. Requests dme-referral to [facility] dme. Per [address] for home delivery tomorrow. Pt ok with home delivery tomorrow.   Electronically signed by MAXIMINO Mcclain on 11/5/2023 at 3:12 PM

## 2023-11-08 LAB — SURGICAL PATHOLOGY REPORT: NORMAL

## 2023-11-09 NOTE — DISCHARGE SUMMARY
Neurosurgery Surgery Discharge Summary    April [facility] SUMMARY:                The patient is a 43 y.o. female who was admitted to the hospital on 11/3/2023  5:17 AM for treatment of lumbar disc herniation. On the day of admission, an L5-S1 PLIF was performed. The patient's hospital course was uncomplicated and consisted of physical therapy, incision observation, and a return to normal oral intake. The patient was discharged on 11/5/2023  5:00 PM tolerating a diet, moving bowels, and urinating without difficulty. The incisions were clean and intact. The patient was discharged to home with [facility] in satisfactory condition with instructions to call the office for a follow up appointment. Hospital Problem List:  Principal Problem:    Lumbar disc herniation with radiculopathy  Active Problems:    Recurrent herniation of lumbar disc  Resolved Problems:    * No resolved hospital problems. *     Procedure(s) (LRB):  Lumbar Five to Sacral One Posterior Lumbar Interior Body Fusion (N/A)    Discharge Medications:   Discharge Medication List as of 11/5/2023  3:40 PM        START taking these medications    Details   oxyCODONE (ROXICODONE) 5 MG immediate release tablet Take 1 tablet by mouth every 4 hours as needed for Pain for up to 7 days. Max Daily Amount: 30 mg, Disp-42 tablet, R-0Normal      diazePAM (VALIUM) 5 MG tablet Take 1 tablet by mouth every 6 hours as needed for Anxiety (Muscle spasms) for up to 10 days. Max Daily Amount: 20 mg, Disp-40 tablet, R-0Normal      docusate sodium (COLACE, DULCOLAX) 100 MG CAPS Take 200 mg by mouth 2 times daily, Disp-60 capsule, R-0Normal           CONTINUE these medications which have NOT CHANGED    Details   varenicline (CHANTIX) 1 MG tablet Take 1 tablet by mouth 2 times dailyHistorical Med      gabapentin (NEURONTIN) 300 MG capsule Take 1 capsule by mouth 3 times daily for 60 days. , Disp-90 capsule, R-1Normal      Denosumab (PROLIA SC) Inject into the

## 2023-11-15 DIAGNOSIS — Z98.890 S/P LAMINECTOMY: Primary | ICD-10-CM

## 2023-11-15 DIAGNOSIS — M51.26 LUMBAR HERNIATED DISC: ICD-10-CM

## 2023-11-17 ENCOUNTER — OFFICE VISIT (OUTPATIENT)
Dept: NEUROSURGERY | Age: 38
End: 2023-11-17

## 2023-11-17 DIAGNOSIS — Z98.1 S/P LUMBAR FUSION: ICD-10-CM

## 2023-11-17 DIAGNOSIS — M51.16 LUMBAR DISC HERNIATION WITH RADICULOPATHY: Primary | ICD-10-CM

## 2023-11-17 PROCEDURE — 99024 POSTOP FOLLOW-UP VISIT: CPT | Performed by: PHYSICIAN ASSISTANT

## 2023-11-17 RX ORDER — GABAPENTIN 300 MG/1
300 CAPSULE ORAL 3 TIMES DAILY
Qty: 90 CAPSULE | Refills: 2 | Status: SHIPPED | OUTPATIENT
Start: 2023-11-17 | End: 2024-02-15

## 2023-11-17 NOTE — PROGRESS NOTES
Encounter for Staple Removal     April D Braden Marks is a 43 y.o.  female  two weeks s/p L5-S1 fusion      Patient presents for staple removal.      Equipment: General staple removal kit, ChloraPrep, sterile gloves     Procedure: Pt was placed in the sitting position. Using a sterile technique, ChloraPrep was used to clean the incisional wound. The wound healing well without signs of infection. Staples were removed with no pain and no complications. Pt tolerated procedure well. Pts questions were answered and wound care instructions and restrictions were discussed. Pt is to return to neurosurgery clinic in 2 weeks for surgery follow-up or sooner if new issues or concerns arise.       Gabapentin refilled to pharmacy

## 2023-12-04 ENCOUNTER — HOSPITAL ENCOUNTER (OUTPATIENT)
Age: 38
Discharge: HOME | End: 2023-12-06
Payer: COMMERCIAL

## 2023-12-04 ENCOUNTER — HOSPITAL ENCOUNTER (OUTPATIENT)
Dept: GENERAL RADIOLOGY | Age: 38
Discharge: HOME | End: 2023-12-06
Payer: COMMERCIAL

## 2023-12-04 ENCOUNTER — OFFICE VISIT (OUTPATIENT)
Dept: NEUROSURGERY | Age: 38
End: 2023-12-04
Payer: COMMERCIAL

## 2023-12-04 DIAGNOSIS — M51.26 LUMBAR HERNIATED DISC: ICD-10-CM

## 2023-12-04 DIAGNOSIS — Z98.1 S/P LUMBAR FUSION: ICD-10-CM

## 2023-12-04 DIAGNOSIS — M51.16 LUMBAR DISC HERNIATION WITH RADICULOPATHY: Primary | ICD-10-CM

## 2023-12-04 DIAGNOSIS — Z98.890 S/P LAMINECTOMY: ICD-10-CM

## 2023-12-04 PROCEDURE — 99024 POSTOP FOLLOW-UP VISIT: CPT | Performed by: PHYSICIAN ASSISTANT

## 2023-12-04 PROCEDURE — 99212 OFFICE O/P EST SF 10 MIN: CPT

## 2023-12-04 PROCEDURE — 72100 X-RAY EXAM L-S SPINE 2/3 VWS: CPT

## 2023-12-04 RX ORDER — OXYCODONE HYDROCHLORIDE 5 MG/1
5 TABLET ORAL EVERY 4 HOURS PRN
Qty: 42 TABLET | Refills: 0 | Status: SHIPPED | OUTPATIENT
Start: 2023-12-04 | End: 2023-12-11

## 2023-12-04 RX ORDER — METHOCARBAMOL 500 MG/1
500 TABLET, FILM COATED ORAL 4 TIMES DAILY
Qty: 40 TABLET | Refills: 0 | Status: SHIPPED | OUTPATIENT
Start: 2023-12-04 | End: 2023-12-14

## 2023-12-04 NOTE — PROGRESS NOTES
Post Operative Follow-up     This is a 43year old female who presents to the office for a 1 month follow-up s/p L5-S1 fusion      Subjective: Patient states she has been doing [place]. \" She continues to have low back pain and does not feel like it has improved since surgery. Leg pain has improved. She has been taking Tylenol for the pain. No issues with incision site. Lumbar XR reviewed. Physical Exam:              WDWN, no apparent distress              Non-labored breathing               Vitals Stable              Alert and oriented x3              CN 3-12 intact              PERRL              EOMI              GÓMEZ well              Motor strength symmetric              Sensation to LT intact bilaterally   Incision healing well with no signs of infection                 Imagin23 lumbar XR: Stable alignment, stable fusion. No acute abnormalities noted. Final report pending. Assessment: This is a 43 y.o.  female presenting for a 1 month follow-up s/p L5-S1 fusion. Stable. Plan:  -Pain control and expectations discussed  -Continue brace and restrictions   -Oxycodone and robaxin sent to pharmacy  -OARRS report reviewed   -Follow-up in neurosurgery clinic in 2 months for 3 month follow up with repeat lumbar XR  -Call or return to neurosurgery office sooner if symptoms worsen or if new issues arise in the interim.     Electronically signed by Devon Raya PA-C on 2023 at 3:16 PM

## 2023-12-27 ENCOUNTER — TELEPHONE (OUTPATIENT)
Dept: NEUROSURGERY | Age: 38
End: 2023-12-27

## 2023-12-27 DIAGNOSIS — Z98.1 S/P LUMBAR FUSION: ICD-10-CM

## 2023-12-27 DIAGNOSIS — M51.16 LUMBAR DISC HERNIATION WITH RADICULOPATHY: ICD-10-CM

## 2023-12-27 RX ORDER — METHOCARBAMOL 500 MG/1
500 TABLET, FILM COATED ORAL 4 TIMES DAILY
Qty: 40 TABLET | Refills: 0 | Status: SHIPPED | OUTPATIENT
Start: 2023-12-27 | End: 2024-01-06

## 2023-12-27 RX ORDER — OXYCODONE HYDROCHLORIDE 5 MG/1
5 TABLET ORAL EVERY 4 HOURS PRN
Qty: 42 TABLET | Refills: 0 | Status: SHIPPED | OUTPATIENT
Start: 2023-12-27 | End: 2024-01-03

## 2023-12-27 NOTE — TELEPHONE ENCOUNTER
Patient would like a refill of her methocarbamol and Oxy, sent to [address] in [facility]

## 2024-01-17 ENCOUNTER — TELEPHONE (OUTPATIENT)
Dept: NEUROSURGERY | Age: 39
End: 2024-01-17

## 2024-01-17 DIAGNOSIS — M51.16 LUMBAR DISC HERNIATION WITH RADICULOPATHY: ICD-10-CM

## 2024-01-17 DIAGNOSIS — Z98.1 S/P LUMBAR FUSION: ICD-10-CM

## 2024-01-17 RX ORDER — OXYCODONE HYDROCHLORIDE 5 MG/1
5 TABLET ORAL EVERY 4 HOURS PRN
Qty: 42 TABLET | Refills: 0 | Status: SHIPPED | OUTPATIENT
Start: 2024-01-17 | End: 2024-01-24

## 2024-01-17 RX ORDER — METHOCARBAMOL 500 MG/1
500 TABLET, FILM COATED ORAL 4 TIMES DAILY
Qty: 40 TABLET | Refills: 0 | Status: SHIPPED | OUTPATIENT
Start: 2024-01-17 | End: 2024-01-27

## 2024-01-31 ENCOUNTER — OFFICE VISIT (OUTPATIENT)
Dept: NEUROSURGERY | Age: 39
End: 2024-01-31
Payer: COMMERCIAL

## 2024-01-31 ENCOUNTER — HOSPITAL ENCOUNTER (OUTPATIENT)
Dept: GENERAL RADIOLOGY | Age: 39
Discharge: HOME | End: 2024-02-02
Payer: COMMERCIAL

## 2024-01-31 ENCOUNTER — HOSPITAL ENCOUNTER (OUTPATIENT)
Age: 39
Discharge: HOME | End: 2024-02-02
Payer: COMMERCIAL

## 2024-01-31 DIAGNOSIS — M54.41 ACUTE MIDLINE LOW BACK PAIN WITH RIGHT-SIDED SCIATICA: Primary | ICD-10-CM

## 2024-01-31 DIAGNOSIS — Z98.1 S/P LUMBAR FUSION: ICD-10-CM

## 2024-01-31 DIAGNOSIS — M51.16 LUMBAR DISC HERNIATION WITH RADICULOPATHY: ICD-10-CM

## 2024-01-31 DIAGNOSIS — M46.1 SACROILIITIS (HCC): Chronic | ICD-10-CM

## 2024-01-31 PROCEDURE — 99212 OFFICE O/P EST SF 10 MIN: CPT

## 2024-01-31 PROCEDURE — 99024 POSTOP FOLLOW-UP VISIT: CPT | Performed by: PHYSICIAN ASSISTANT

## 2024-01-31 PROCEDURE — 72100 X-RAY EXAM L-S SPINE 2/3 VWS: CPT

## 2024-01-31 RX ORDER — OXYCODONE HYDROCHLORIDE 5 MG/1
5 TABLET ORAL EVERY 4 HOURS PRN
Qty: 42 TABLET | Refills: 0 | Status: SHIPPED | OUTPATIENT
Start: 2024-01-31 | End: 2024-02-07

## 2024-01-31 RX ORDER — METHOCARBAMOL 500 MG/1
500 TABLET, FILM COATED ORAL 4 TIMES DAILY
Qty: 120 TABLET | Refills: 1 | Status: SHIPPED | OUTPATIENT
Start: 2024-01-31 | End: 2024-03-31

## 2024-01-31 RX ORDER — METHOCARBAMOL 500 MG/1
500 TABLET, FILM COATED ORAL 4 TIMES DAILY
COMMUNITY
End: 2024-01-31 | Stop reason: SDUPTHER

## 2024-01-31 NOTE — PROGRESS NOTES
Post Operative Follow-up     This is a 38 year old female who presents to the office for a 3 month follow-up s/p L5-S1 fusion      Subjective: Patient states she continues to have have buttock pain and right leg pain/weakness. She fell last month due to her right leg giving out. She does feel her low back pain has improved. No issues with incision site. Lumbar XR reviewed.      Physical Exam:              WDWN, no apparent distress              Non-labored breathing               Vitals Stable              Alert and oriented x3              CN 3-12 intact              PERRL              EOMI              GÓMEZ well              Motor strength symmetric              Sensation to LT intact bilaterally   Incision healing well with no signs of infection                 Imagin24: lumbar XR: Stable alignment, stable fusion. No acute abnormalities noted. Final report pending.       Assessment: This is a 38 y.o.  female presenting for a 3 month follow-up s/p L5-S1 fusion. Stable.      Plan:  -Pain control and expectations discussed  -XR reviewed. Stable  -No restrictions. Referral to PT placed  -Oxycodone and robaxin sent to pharmacy. Pain management may continue at this time. Recommend either injections/RFA right SI joint  -OARRS report reviewed   -Follow-up in neurosurgery clinic in 9 months for 1 year follow up with repeat lumbar XR  -Call or return to neurosurgery office sooner if symptoms worsen or if new issues arise in the interim.    Electronically signed by Dunia Saez PA-C on 2024 at 12:33 PM

## 2024-02-06 ENCOUNTER — PREP FOR PROCEDURE (OUTPATIENT)
Dept: PAIN MANAGEMENT | Age: 39
End: 2024-02-06

## 2024-02-06 ENCOUNTER — OFFICE VISIT (OUTPATIENT)
Dept: PAIN MANAGEMENT | Age: 39
End: 2024-02-06
Payer: COMMERCIAL

## 2024-02-06 VITALS
WEIGHT: 132 LBS | BODY MASS INDEX: 23.39 KG/M2 | DIASTOLIC BLOOD PRESSURE: 82 MMHG | RESPIRATION RATE: 18 BRPM | OXYGEN SATURATION: 97 % | SYSTOLIC BLOOD PRESSURE: 122 MMHG | HEIGHT: 63 IN | TEMPERATURE: 97.5 F | HEART RATE: 84 BPM

## 2024-02-06 DIAGNOSIS — M47.817 LUMBOSACRAL SPONDYLOSIS WITHOUT MYELOPATHY: ICD-10-CM

## 2024-02-06 DIAGNOSIS — M51.36 DDD (DEGENERATIVE DISC DISEASE), LUMBAR: ICD-10-CM

## 2024-02-06 DIAGNOSIS — M54.16 LUMBAR RADICULAR PAIN: Primary | ICD-10-CM

## 2024-02-06 DIAGNOSIS — Z98.1 S/P LUMBAR FUSION: ICD-10-CM

## 2024-02-06 DIAGNOSIS — F17.200 SMOKING: ICD-10-CM

## 2024-02-06 PROCEDURE — 99214 OFFICE O/P EST MOD 30 MIN: CPT | Performed by: ANESTHESIOLOGY

## 2024-02-06 PROCEDURE — 1036F TOBACCO NON-USER: CPT | Performed by: ANESTHESIOLOGY

## 2024-02-06 PROCEDURE — G8484 FLU IMMUNIZE NO ADMIN: HCPCS | Performed by: ANESTHESIOLOGY

## 2024-02-06 PROCEDURE — G8427 DOCREV CUR MEDS BY ELIG CLIN: HCPCS | Performed by: ANESTHESIOLOGY

## 2024-02-06 PROCEDURE — 99213 OFFICE O/P EST LOW 20 MIN: CPT | Performed by: ANESTHESIOLOGY

## 2024-02-06 PROCEDURE — G8420 CALC BMI NORM PARAMETERS: HCPCS | Performed by: ANESTHESIOLOGY

## 2024-02-06 RX ORDER — SODIUM CHLORIDE 0.9 % (FLUSH) 0.9 %
5-40 SYRINGE (ML) INJECTION PRN
Status: CANCELLED | OUTPATIENT
Start: 2024-02-06

## 2024-02-06 RX ORDER — SODIUM CHLORIDE 9 MG/ML
INJECTION, SOLUTION INTRAVENOUS PRN
Status: CANCELLED | OUTPATIENT
Start: 2024-02-06

## 2024-02-06 RX ORDER — SODIUM CHLORIDE 0.9 % (FLUSH) 0.9 %
5-40 SYRINGE (ML) INJECTION EVERY 12 HOURS SCHEDULED
Status: CANCELLED | OUTPATIENT
Start: 2024-02-06

## 2024-02-06 RX ORDER — CYCLOBENZAPRINE HCL 5 MG
5 TABLET ORAL 2 TIMES DAILY PRN
Qty: 30 TABLET | Refills: 0 | Status: SHIPPED | OUTPATIENT
Start: 2024-02-06 | End: 2024-02-21

## 2024-02-06 NOTE — PROGRESS NOTES
Ashley Espana presents to the [facility] on 2/6/2024. April is complaining of pain in her lower back that radiates to RLE. The pain is constant. The pain is described as numb and tight, swollen, pinching, aching. Pain is rated on her best day at a 2, on her worst day at a 7, and on average at a 4 on the VAS scale. She took her last dose of Neurontin and robaxin and oxycodone  today.      Any procedures since your last visit: No    She is not on NSAIDS and  is not on anticoagulation medications to include none and is managed by NA.     Pacemaker or defibrillator: No     Medication Contract and Consent for Opioid Use Documents Filed        No documents found                       Resp 18   Ht 1.6 m (5' 2.99\")   Wt 59.9 kg (132 lb)   LMP 03/20/2018   BMI 23.39 kg/m²      Patient's last menstrual period was 03/20/2018.  
Inject into the skin every 6 months   Yes ProviderTracy MD   calcium carb-cholecalciferol 600-5 MG-MCG TABS tablet take 2 tablets by mouth once daily 23  Yes Tracy Muñoz MD   docusate sodium (COLACE, DULCOLAX) 100 MG CAPS Take 200 mg by mouth 2 times daily  Patient not taking: Reported on 2024   Quirino Talley MD   varenicline (CHANTIX) 1 MG tablet Take 1 tablet by mouth 2 times daily  Patient not taking: Reported on 2024 10/10/23   Tracy Muñoz MD       Allergies   Allergen Reactions    Tape [Adhesive Tape] Rash       Social History     Socioeconomic History    Marital status:      Spouse name: Not on file    Number of children: Not on file    Years of education: Not on file    Highest education level: Not on file   Occupational History    Not on file   Tobacco Use    Smoking status: Former     Current packs/day: 0.00     Average packs/day: 1 pack/day for 20.0 years (20.0 ttl pk-yrs)     Types: Cigarettes     Start date: 2003     Quit date: 2023     Years since quittin.4     Passive exposure: Current    Smokeless tobacco: Never   Vaping Use    Vaping Use: Every day    Substances: THC   Substance and Sexual Activity    Alcohol use: Not Currently     Comment: socially    Drug use: Yes     Types: Marijuana (Weed)     Comment: occassional    Sexual activity: Not on file   Other Topics Concern    Not on file   Social History Narrative    Not on file     Social Determinants of Health     Financial Resource Strain: Not on file   Food Insecurity: Not on file (11/3/2023)   Transportation Needs: No Transportation Needs (2023)    Transportation Problems ([facility] HRSN)     In the past 12 months, has lack of reliable transportation kept you from medical appointments, meetings, work or from getting things needed for daily living?: Not on file   Recent Concern: Transportation Needs - Unmet Transportation Needs (11/3/2023)    PRAPARE - Transportation     Lack

## 2024-02-06 NOTE — H&P (VIEW-ONLY)
[facility]  [address], [place] [address]  [phone]    Follow up Note      April D Bever     Date of Visit:  2/6/2024    CC:  Patient presents for follow up   Chief Complaint   Patient presents with    Back Pain     HERE FOR REEVALUATION AFTER SURGERY DAVID NOV 2023     HPI:  H/o low back and LE pain. Failed conservative treatment.    S/P right L5-S1 hemilaminectomy and discectomy on 4/27/2023 by Dr. Talley.     Had recurrent disc herniation and subsequently underwent L5-S1 fusion in 11/3/2023.    Has helped significantly.    But continues to have right sided low back pain and right LE pain.  Tingling/ numbness/ weakness + right LE.    Nursing notes and details of the pain history reviewed. Please see intake notes for details.    She is taking Gabapentin/ Oxycodone/ NSAID's     Previous treatments:   Physical Therapy : yes, continues HEP. Chiropractic treatment: yes     Medications: - NSAID's : yes                        - Membrane stabilizers : yes                       - Opioids : no                       - Adjuvants or Others : yes     Spine Surgeries: yes      Anticoagulation medications: no     H/o Smoking: yes  H/o alcohol abuse : no  H/o Illicit drug use : CBD - daily. Occasional THC use +     Employment: Currently not working    Imaging studies:    Xray LS spine: 2/1/2024:  IMPRESSION:  Posterior fusion of L5 and S1 with no hardware complication or malalignment.    MRI of LS spine: 6/10/2023:   ( Has undergone L5-S1 fusion in nov 2023 since  this MRI)  IMPRESSION:  Postoperative change as detailed above.  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  This suggests a recurrent disc extrusion with surrounding  scar/granulation tissue causing right lateral recess stenosis and mass effect  on the right S1 nerve root.    MRI of the lumbar spine 3/15/2023: (has undergone surgery since this

## 2024-02-07 ENCOUNTER — TELEPHONE (OUTPATIENT)
Dept: NEUROSURGERY | Age: 39
End: 2024-02-07

## 2024-02-07 NOTE — TELEPHONE ENCOUNTER
No need to wear LSO brace anymore. She will need to continue bone stimulator for 1 year after surgery. Please let patient know, thanks!   Warm/Dry

## 2024-02-07 NOTE — TELEPHONE ENCOUNTER
Patient called in leaving a voicemail that she needs clarification on how long she is  to wear her hard-shell brace and how long she is to use/wear her spinal stimulator as she is seeing  now and he feels she she be done with both.     Please advise.     Thank you!

## 2024-02-08 NOTE — TELEPHONE ENCOUNTER
Patient contacted and a message is left with detail regarding brace and bone stimulator for her as well as if she should have any further questions to contact the office.

## 2024-02-21 ENCOUNTER — TELEPHONE (OUTPATIENT)
Dept: PAIN MANAGEMENT | Age: 39
End: 2024-02-21

## 2024-02-21 DIAGNOSIS — M54.16 LUMBAR RADICULOPATHY: ICD-10-CM

## 2024-02-21 NOTE — TELEPHONE ENCOUNTER
Call to Ashley Espana that procedure was approved for 2/27/2024 and that [facility] should call her a few days before for the pre op call and after 3:00 PM the business day before with the arrival time. Instructed April to hold ibuprofen for 24 hours, naprosyn for 4 days and any aspirin containing products or fish oil for 7 days. Instructed to call office back if any questions. April verbalized understanding.    Electronically signed by Edu Beasley RN on 2/21/2024 at 11:51 AM

## 2024-02-22 RX ORDER — OXYCODONE HYDROCHLORIDE 5 MG/1
5 CAPSULE ORAL EVERY 4 HOURS PRN
COMMUNITY

## 2024-02-26 ENCOUNTER — EVALUATION (OUTPATIENT)
Dept: PHYSICAL THERAPY | Age: 39
End: 2024-02-26
Payer: COMMERCIAL

## 2024-02-26 DIAGNOSIS — M54.41 ACUTE MIDLINE LOW BACK PAIN WITH RIGHT-SIDED SCIATICA: Primary | ICD-10-CM

## 2024-02-26 DIAGNOSIS — Z98.1 S/P LUMBAR FUSION: ICD-10-CM

## 2024-02-26 DIAGNOSIS — M46.1 SACROILIITIS (HCC): ICD-10-CM

## 2024-02-26 PROCEDURE — 97163 PT EVAL HIGH COMPLEX 45 MIN: CPT | Performed by: PHYSICAL THERAPIST

## 2024-02-26 NOTE — PROGRESS NOTES
Physical Therapy Treatment Note    Date: 2024  Patient Name: Ashley Espana  Daughter: Gauri.   : 1985   MRN: [id number]  DOInjury: chronic  DOSx: 11/3/2023  Referring Provider: Dunia Saez PA-C  [address] [place] [address]     Medical Diagnosis:      Diagnosis Orders   1. Acute midline low back pain with right-sided sciatica        2. S/P lumbar fusion        3. Sacroiliitis (HCC)            April is 3 months post lumbar fusion.  Her mobility is pretty good.  She does demonstrate R LE and trunk weakness.  She also voices fear avoidance and fear of re-injury.  We has a long discussion about back rehab, need for progressive exercise and loading to improve function, and possibility that leg function may not change or change very little due to the length of time since onset.       Outcome Measure:   Oswestry Low Back Disability Questionnaire 46% disability      X = TO BE PERFORMED NEXT VISIT  > = PROGRESS TO THIS    S: See baljeetal  O:   Time 2867-0383     Visit -    Auth pending Repeat outcome measure at mid point and end.    Pain Pain 2-7/10     ROM See michael     Modalities            Manual         MT   ROM      SKTC   NR   DKTC   NR   Seated flexion   NR         Exercise            ROWS: H   TE   ROWS: M     TE   ROWS: L      TE   Marching   TA   Squats   TE   Bridging   TE   Clamshell   TE   Sidelying R leg abduction      Plank      Side plank            A:  See eval  P: Continue with rehab plan  Jose Garber, PT    Treatment Charges: Mins Units   Initial Evaluation 45 1   Re-Evaluation     Ther Exercise         TE     Manual Therapy     MT     Ther Activities        TA     Gait Training          GT     Neuro Re-education NR     Modalities     Non-Billable Service Time     Other     Total Time/Units 45 1

## 2024-02-26 NOTE — PROGRESS NOTES
[facility] OUTPATIENT REHABILITATION  PHYSICAL THERAPY INITIAL EVALUATION         Date:  2024   Patient: Ashley Espana  : 1985  MRN: [id number]  Referring Provider: Dunia Saez PA-C  [facility] [place] [address]     Medical Diagnosis:      Diagnosis Orders   1. Acute midline low back pain with right-sided sciatica        2. S/P lumbar fusion        3. Sacroiliitis (HCC)            Physician Order: Eval and Treat     SUBJECTIVE:     Onset date: Back problems since age 17     History / Mechanism of Injury: April is here with her daughter, Gauri.  April reports back pain most of her life.  Had the 2 surgeries noted below.  No longer on any restrictions.  Wears bone stimulator 2 hours per day.  She is to continue with bone stimulator to 1 year anniversary of surgery.      Surgical history:   L5-S1 fusion 11/3/2023   L5-S1 laminectomy 2023      Disturbed Sleep: yes -- about 1-2 hours at a time  Bladder Dysfunction: no  Bowel Dysfunction: yes - started 6 months prior to surgery; persists    Chief complaint:   Pain back and R LE - chronic since age 17, calls her R leg her dead leg -- weakness, altered function, and swelling -- notes no one can tell her why her leg swells; has been told her leg may not improve given the length of time she has had altered function.  Sitting tolerance: 30 minutes  Standing tolerance: 60 minutes  Walking tolerance: 5-10 minutes, better with shorter stride; shopping cart helps prolong walking duration  ADLs - limited R shoes / socks, limited hygiene / grooming  IADLs - limited due to limited weightbearing duration.  Keeps wheelchair in kitchen for seated breaks  Lifting / Carrying -- Does not lift anything    Behavior: condition is staying the same since surgery     Pain:   Pain 2-7/10    Symptom Type / Quality: aching, burning , pinching, tightness  Location: across back, tailbone, R LE - glute, posterior leg to foot          Imaging

## 2024-02-27 ENCOUNTER — HOSPITAL ENCOUNTER (OUTPATIENT)
Dept: OPERATING ROOM | Age: 39
Setting detail: OUTPATIENT SURGERY
Discharge: HOME | End: 2024-02-27
Attending: ANESTHESIOLOGY
Payer: COMMERCIAL

## 2024-02-27 ENCOUNTER — HOSPITAL ENCOUNTER (OUTPATIENT)
Age: 39
Setting detail: OUTPATIENT SURGERY
Discharge: HOME | End: 2024-02-27
Attending: ANESTHESIOLOGY | Admitting: ANESTHESIOLOGY
Payer: COMMERCIAL

## 2024-02-27 VITALS
BODY MASS INDEX: 24.29 KG/M2 | OXYGEN SATURATION: 99 % | TEMPERATURE: 97.2 F | HEIGHT: 62 IN | WEIGHT: 132 LBS | DIASTOLIC BLOOD PRESSURE: 61 MMHG | HEART RATE: 62 BPM | RESPIRATION RATE: 18 BRPM | SYSTOLIC BLOOD PRESSURE: 126 MMHG

## 2024-02-27 DIAGNOSIS — M51.9 LUMBAR DISC DISEASE: ICD-10-CM

## 2024-02-27 PROCEDURE — 6360000002 HC RX W HCPCS: Performed by: ANESTHESIOLOGY

## 2024-02-27 PROCEDURE — 6360000004 HC RX CONTRAST MEDICATION: Performed by: ANESTHESIOLOGY

## 2024-02-27 PROCEDURE — 7100000010 HC PHASE II RECOVERY - FIRST 15 MIN: Performed by: ANESTHESIOLOGY

## 2024-02-27 PROCEDURE — 7100000011 HC PHASE II RECOVERY - ADDTL 15 MIN: Performed by: ANESTHESIOLOGY

## 2024-02-27 PROCEDURE — 2500000003 HC RX 250 WO HCPCS: Performed by: ANESTHESIOLOGY

## 2024-02-27 PROCEDURE — 64483 NJX AA&/STRD TFRM EPI L/S 1: CPT | Performed by: ANESTHESIOLOGY

## 2024-02-27 PROCEDURE — 2709999900 HC NON-CHARGEABLE SUPPLY: Performed by: ANESTHESIOLOGY

## 2024-02-27 PROCEDURE — 3600000005 HC SURGERY LEVEL 5 BASE: Performed by: ANESTHESIOLOGY

## 2024-02-27 RX ORDER — SODIUM CHLORIDE 0.9 % (FLUSH) 0.9 %
5-40 SYRINGE (ML) INJECTION PRN
Status: DISCONTINUED | OUTPATIENT
Start: 2024-02-27 | End: 2024-02-27 | Stop reason: HOSPADM

## 2024-02-27 RX ORDER — LIDOCAINE HYDROCHLORIDE 5 MG/ML
INJECTION, SOLUTION INFILTRATION; INTRAVENOUS PRN
Status: DISCONTINUED | OUTPATIENT
Start: 2024-02-27 | End: 2024-02-27 | Stop reason: ALTCHOICE

## 2024-02-27 RX ORDER — DEXAMETHASONE SODIUM PHOSPHATE 10 MG/ML
INJECTION, SOLUTION INTRAMUSCULAR; INTRAVENOUS PRN
Status: DISCONTINUED | OUTPATIENT
Start: 2024-02-27 | End: 2024-02-27 | Stop reason: ALTCHOICE

## 2024-02-27 RX ORDER — SODIUM CHLORIDE 0.9 % (FLUSH) 0.9 %
5-40 SYRINGE (ML) INJECTION EVERY 12 HOURS SCHEDULED
Status: DISCONTINUED | OUTPATIENT
Start: 2024-02-27 | End: 2024-02-27 | Stop reason: HOSPADM

## 2024-02-27 RX ORDER — SODIUM CHLORIDE 9 MG/ML
INJECTION, SOLUTION INTRAVENOUS PRN
Status: DISCONTINUED | OUTPATIENT
Start: 2024-02-27 | End: 2024-02-27 | Stop reason: HOSPADM

## 2024-02-27 ASSESSMENT — PAIN DESCRIPTION - DESCRIPTORS
DESCRIPTORS: ACHING
DESCRIPTORS: ACHING

## 2024-02-27 ASSESSMENT — PAIN - FUNCTIONAL ASSESSMENT
PAIN_FUNCTIONAL_ASSESSMENT: 0-10

## 2024-02-27 NOTE — DISCHARGE INSTRUCTIONS
[facility]  [phone]   Post-Pain Block/ Radiofrequency Home Going Instructions    1-Go home, rest for the remainder of the day  2-Please do not lift over 20 pounds the day of the injection  3-If you received sedation No: alcohol, driving, operating lawn mowers, plows, tractors or other dangerous equipment until next morning. Do not make important decisions or sign legal documents for 24 hours. You may experience light headedness, dizziness, nausea or sleepiness after sedation. Do not stay alone. A responsible adult must be with you for 24 hours. You could be nauseated from the medications you have received. Your IV site may be sore and bruised.    4-No dietary restrictions     5-Resume all medications the same day, blood thinners to be resumed 24 hours after injection    6-Keep the surgical site clean and dry, you may shower the next morning and remove the      dressing.     7- No sitz baths, tub baths or hot tubs/swimming for 24 hours.       8- If you have any pain at the injection site(s), application of an ice pack to the area should be       helpful, 20 minutes on/20 minutes off for next 48 hours.  9- Call [facility] pain management immediately at if you develop.  Fever greater than 100.4 F  Have bleeding or drainage from the puncture site  Have progressive Leg/arm numbness and or weakness  Loss of control of bowel and or bladder (wet/soil yourself)  Severe headache with inability to lift head  10-You may return to work the next day

## 2024-02-27 NOTE — OP NOTE
Operative Note      Patient: Ashley Espana  YOB: 1985  MRN: [id number]    Date of Procedure: 2024    Pre-Op Diagnosis Codes:     * Lumbar radiculopathy [M54.16]    Post-Op Diagnosis: Same       Procedure(s):  LUMBAR TRANSFORAMINAL EPIDURAL STEROID INJECTION RIGHT SACRAL 1 UNDER FLUOROSCOPIC GUIDANCE    Surgeon(s):  Sajan Sandoval MD    Assistant:   * No surgical staff found *    Anesthesia: Local    Estimated Blood Loss (mL): Minimal    Complications: None    Specimens:   * No specimens in log *    Implants:  * No implants in log *      Drains: * No LDAs found *    Findings: good needle placement        Detailed Description of Procedure:   2024    Patient: Ashley Espana  :  1985  Age:  39 y.o.  Sex:  female     PRE-OPERATIVE DIAGNOSIS: lumbar neural foraminal stenosis, lumbar radiculopathy.     POST-OPERATIVE DIAGNOSIS: Same.    PROCEDURE: Right Transforaminal epidural steroid injection under fluoroscopic guidance at foraminal level S1.    SURGEON: Sajan Sandoval MD    ANESTHESIA: Lopcal    ESTIMATED BLOOD LOSS: None.  ______________________________________________________________________  BRIEF HISTORY: Ashley Espana comes in today for the first Right transforaminal epidural steroid injection under fluoroscopic guidance at foraminal level S1 . The potential complications of this procedure were discussed with her again today.  She has elected to undergo the aforementioned procedure.     April’s complete History & Physical examination were reviewed in depth, a copy of which is in the chart.      DESCRIPTION OF PROCEDURE:    After confirming written and informed consent, a time-out was performed and April’s name and date of birth, the procedure to be performed as well as the plan for the location of the needle insertion were confirmed.    The patient was brought into the procedure room and placed in the prone position on the fluoroscopy table. Standard monitors were placed and

## 2024-02-27 NOTE — INTERVAL H&P NOTE
Update History & Physical    The patient's History and Physical of February 6, 2024 was reviewed with the patient and I examined the patient. There was no change. The surgical site was confirmed by the patient and me.     Plan: The risks, benefits, expected outcome, and alternative to the recommended procedure have been discussed with the patient. Patient understands and wants to proceed with the procedure.     Electronically signed by Sajan Sandoval MD on 2/27/2024

## 2024-02-28 ENCOUNTER — TREATMENT (OUTPATIENT)
Dept: PHYSICAL THERAPY | Age: 39
End: 2024-02-28
Payer: COMMERCIAL

## 2024-02-28 DIAGNOSIS — Z98.1 S/P LUMBAR FUSION: ICD-10-CM

## 2024-02-28 DIAGNOSIS — M46.1 SACROILIITIS (HCC): ICD-10-CM

## 2024-02-28 DIAGNOSIS — M54.41 ACUTE MIDLINE LOW BACK PAIN WITH RIGHT-SIDED SCIATICA: Primary | ICD-10-CM

## 2024-02-28 PROCEDURE — 97110 THERAPEUTIC EXERCISES: CPT

## 2024-02-28 NOTE — PROGRESS NOTES
Physical Therapy Treatment Note    Date: 2024  Patient Name: Ashley Espana  Daughter: Gauri.   : 1985   MRN: [id number]  DOInjury: chronic  DOSx: 11/3/2023  Referring Provider: Dunia Saez PA-C  [address] [place] [address]     Medical Diagnosis:      Diagnosis Orders   1. Acute midline low back pain with right-sided sciatica        2. S/P lumbar fusion        3. Sacroiliitis (HCC)              April is 3 months post lumbar fusion.  Her mobility is pretty good.  She does demonstrate R LE and trunk weakness.  She also voices fear avoidance and fear of re-injury.  We has a long discussion about back rehab, need for progressive exercise and loading to improve function, and possibility that leg function may not change or change very little due to the length of time since onset.       Outcome Measure:   Oswestry Low Back Disability Questionnaire 46% disability      X = TO BE PERFORMED NEXT VISIT  > = PROGRESS TO THIS    S: pt reports no new changes since her first visit .   O:   Time 9141-3526 am     Visit -    Auth pending Repeat outcome measure at mid point and end.    Pain Pain 2-5/10     ROM See michael     Modalities            Manual         MT   ROM  Pt given copies of all ex's 2024    SKTC 1 x 10 new  Copies given to perform all ex's 2 x 10 reps ea for HEP  NR   DKTC 1 x 10 new   NR   Seated flexion 1 x 10 new   NR         Exercise            ROWS: H   TE   ROWS: M     TE   ROWS: L      TE   Marching   TA   Squats   TE   Bridging 1 x 10 new   TE   Clamshell 1 x 10 new   TE   Sidelying R leg abduction Next      Plank      Side plank            A:  tolerated well.   Pt able to tolerate all newly added ex's as listed .  P: Continue with rehab plan  Jose Angel Arnett PTA    Treatment Charges: Mins Units   Initial Evaluation     Re-Evaluation     Ther Exercise         TE 30 2   Manual Therapy     MT     Ther Activities        TA     Gait Training          GT     Neuro Re-education NR

## 2024-03-06 ENCOUNTER — TREATMENT (OUTPATIENT)
Dept: PHYSICAL THERAPY | Age: 39
End: 2024-03-06
Payer: COMMERCIAL

## 2024-03-06 DIAGNOSIS — M46.1 SACROILIITIS (HCC): ICD-10-CM

## 2024-03-06 DIAGNOSIS — M54.41 ACUTE MIDLINE LOW BACK PAIN WITH RIGHT-SIDED SCIATICA: Primary | ICD-10-CM

## 2024-03-06 DIAGNOSIS — Z98.1 S/P LUMBAR FUSION: ICD-10-CM

## 2024-03-06 PROCEDURE — 97112 NEUROMUSCULAR REEDUCATION: CPT

## 2024-03-06 NOTE — PROGRESS NOTES
ROWS: H   TE   ROWS: M     TE   ROWS: L      TE   Marching   TA   Squats   TE   Bridging 1 x 10   TE   Clamshell 1 x 10   TE   Sidelying R leg abduction 1 x 10     Plank      Side plank            A:  tolerated well. Cues for engaging lower abdominals for lumbar stabilization. Feedback and cues necessary for developing neuromuscular control.  Movement education and guided movement interventions such as verbal and tactile cues used to improve performance and control. No new or significant changes this session  P: Continue with rehab plan  Paulina Hou PTA    Treatment Charges: Mins Units   Initial Evaluation     Re-Evaluation     Ther Exercise         TE     Manual Therapy     MT     Ther Activities        TA     Gait Training          GT     Neuro Re-education NR 30 2   Modalities     Non-Billable Service Time     Other     Total Time/Units 30 2

## 2024-03-08 ENCOUNTER — TREATMENT (OUTPATIENT)
Dept: PHYSICAL THERAPY | Age: 39
End: 2024-03-08

## 2024-03-08 DIAGNOSIS — M46.1 SACROILIITIS (HCC): ICD-10-CM

## 2024-03-08 DIAGNOSIS — Z98.1 S/P LUMBAR FUSION: ICD-10-CM

## 2024-03-08 DIAGNOSIS — M54.41 ACUTE MIDLINE LOW BACK PAIN WITH RIGHT-SIDED SCIATICA: Primary | ICD-10-CM

## 2024-03-08 NOTE — PROGRESS NOTES
perform all ex's 2 x 10 reps ea for HEP  NR   DKTC  NR   Seated flexion 1 x 10   NR         Exercise         TE   ROWS: H   TE   ROWS: M   Green 1 x 15 new   TE   ROWS: L    Green 1 x 15 new  TE   Marching 1 x 15 new   TA   Squats 1 x 15 new   TE   Bridging  TE   Clamshell 1 x 10   TE   Sidelying R leg abduction     Plank      Side plank            A:  tolerated  fair .   Pt having a soreness/painful day .   Pt only able to tolerate limited mat ex's along with newly listed ex's at limited reps .Feedback and cues necessary for developing neuromuscular control.  Movement education and guided movement interventions such as verbal and tactile cues used to improve performance and control. No new or significant changes this session  P: Continue with rehab plan  Jose Angel Arnett PTA    Treatment Charges: Mins Units   Initial Evaluation     Re-Evaluation     Ther Exercise         TE     Manual Therapy     MT     Ther Activities        TA     Gait Training          GT     Neuro Re-education NR 30 2   Modalities     Non-Billable Service Time     Other     Total Time/Units 30 2

## 2024-03-12 ENCOUNTER — TREATMENT (OUTPATIENT)
Dept: PHYSICAL THERAPY | Age: 39
End: 2024-03-12
Payer: COMMERCIAL

## 2024-03-12 DIAGNOSIS — Z98.1 S/P LUMBAR FUSION: ICD-10-CM

## 2024-03-12 DIAGNOSIS — M54.41 ACUTE MIDLINE LOW BACK PAIN WITH RIGHT-SIDED SCIATICA: Primary | ICD-10-CM

## 2024-03-12 DIAGNOSIS — M46.1 SACROILIITIS (HCC): ICD-10-CM

## 2024-03-12 PROCEDURE — 97112 NEUROMUSCULAR REEDUCATION: CPT

## 2024-03-12 NOTE — PROGRESS NOTES
Physical Therapy Treatment Note    Date: 3/12/2024  Patient Name: Ashley Espana  Daughter: Gauri.   : 1985   MRN: [id number]  DOInjury: chronic  DOSx: 11/3/2023  Referring Provider: Dunia Saez PA-C  [address] [place] [address]     Medical Diagnosis:      Diagnosis Orders   1. Acute midline low back pain with right-sided sciatica        2. S/P lumbar fusion        3. Sacroiliitis (HCC)            April is 3 months post lumbar fusion.  Her mobility is pretty good.  She does demonstrate R LE and trunk weakness.  She also voices fear avoidance and fear of re-injury.  We has a long discussion about back rehab, need for progressive exercise and loading to improve function, and possibility that leg function may not change or change very little due to the length of time since onset.       Outcome Measure:   Oswestry Low Back Disability Questionnaire 46% disability    Access Code: [email or web address]  URL: [email or web address]  Date: 2024  Prepared by: Paulina Hou    Exercises  - Hooklying Single Knee to Chest Stretch  - 1 x daily - 3-4 x weekly - 2 sets - 10 reps  - Supine Double Knee to Chest  - 1 x daily - 2 sets - 10 reps  - Supine Bridge  - 1 x daily - 2-3 sets - 15-20 reps  - Clamshell  - 1 x daily - 2 sets - 10 reps  - Sidelying Hip Abduction  - 1 x daily - 10 reps  - Seated Flexion Stretch  - 1 x daily - 2 sets - 10 reps  X = TO BE PERFORMED NEXT VISIT  > = PROGRESS TO THIS  Precaution: Osteopenia     S: pt  reports still being sore today , but not as bad as last rx session .  .  O:   Time 2270-5096 am     Visit   Reference #: [id number]  [id number]- 0/36 units  05901- 36 units  86825- 0/36 units  3/6/2024 through 2024    Repeat outcome measure at mid point and end.    Pain Pain 5/10     ROM See eval     Modalities            Manual         MT   ROM  Pt given copies of all ex's 2024    SKTC   Copies given to perform all ex's 2 x 10 reps ea for HEP  NR   DKTC  NR   Seated

## 2024-03-15 ENCOUNTER — TREATMENT (OUTPATIENT)
Dept: PHYSICAL THERAPY | Age: 39
End: 2024-03-15

## 2024-03-15 DIAGNOSIS — Z98.1 S/P LUMBAR FUSION: ICD-10-CM

## 2024-03-15 DIAGNOSIS — M46.1 SACROILIITIS (HCC): ICD-10-CM

## 2024-03-15 DIAGNOSIS — M54.41 ACUTE MIDLINE LOW BACK PAIN WITH RIGHT-SIDED SCIATICA: Primary | ICD-10-CM

## 2024-03-15 NOTE — PROGRESS NOTES
Physical Therapy Treatment Note    Date: 3/15/2024  Patient Name: Ashley Espana  Daughter: Gauri.   : 1985   MRN: [id number]  DOInjury: chronic  DOSx: 11/3/2023  Referring Provider: Dunia Saez PA-C  [address] [place] [address]     Medical Diagnosis:      Diagnosis Orders   1. Acute midline low back pain with right-sided sciatica        2. S/P lumbar fusion        3. Sacroiliitis (HCC)            April is 3 months post lumbar fusion.  Her mobility is pretty good.  She does demonstrate R LE and trunk weakness.  She also voices fear avoidance and fear of re-injury.  We has a long discussion about back rehab, need for progressive exercise and loading to improve function, and possibility that leg function may not change or change very little due to the length of time since onset.       Outcome Measure:   Oswestry Low Back Disability Questionnaire 46% disability    Access Code: [email or web address]  URL: [email or web address]  Date: 2024  Prepared by: Paulina Hou    Exercises  - Hooklying Single Knee to Chest Stretch  - 1 x daily - 3-4 x weekly - 2 sets - 10 reps  - Supine Double Knee to Chest  - 1 x daily - 2 sets - 10 reps  - Supine Bridge  - 1 x daily - 2-3 sets - 15-20 reps  - Clamshell  - 1 x daily - 2 sets - 10 reps  - Sidelying Hip Abduction  - 1 x daily - 10 reps  - Seated Flexion Stretch  - 1 x daily - 2 sets - 10 reps  X = TO BE PERFORMED NEXT VISIT  > = PROGRESS TO THIS  Precaution: Osteopenia     S: pt  reports feeling sore again today .  .  O:   Time 4020-2826 am     Visit   Reference #: [id number]  [id number]- 0/36 units  06192- 836 units  16620- 0/36 units  3/6/2024 through 2024    Repeat outcome measure at mid point and end.    Pain Pain 5/10     ROM See eval     Modalities            Manual         MT   ROM  Pt given copies of all ex's 2024    SKTC   Copies given to perform all ex's 2 x 10 reps ea for HEP  NR   DKTC  NR   Seated flexion 1 x 10   NR         Exercise

## 2024-03-19 ENCOUNTER — OFFICE VISIT (OUTPATIENT)
Dept: PAIN MANAGEMENT | Age: 39
End: 2024-03-19
Payer: COMMERCIAL

## 2024-03-19 ENCOUNTER — TREATMENT (OUTPATIENT)
Dept: PHYSICAL THERAPY | Age: 39
End: 2024-03-19

## 2024-03-19 VITALS
SYSTOLIC BLOOD PRESSURE: 112 MMHG | WEIGHT: 132 LBS | TEMPERATURE: 96.2 F | DIASTOLIC BLOOD PRESSURE: 78 MMHG | BODY MASS INDEX: 24.29 KG/M2 | HEART RATE: 68 BPM | RESPIRATION RATE: 18 BRPM | OXYGEN SATURATION: 100 % | HEIGHT: 62 IN

## 2024-03-19 DIAGNOSIS — M54.41 ACUTE MIDLINE LOW BACK PAIN WITH RIGHT-SIDED SCIATICA: Primary | ICD-10-CM

## 2024-03-19 DIAGNOSIS — M54.16 LUMBAR RADICULAR PAIN: ICD-10-CM

## 2024-03-19 DIAGNOSIS — M46.1 SACROILIITIS (HCC): ICD-10-CM

## 2024-03-19 DIAGNOSIS — M96.1 POSTLAMINECTOMY SYNDROME, LUMBAR: Primary | ICD-10-CM

## 2024-03-19 DIAGNOSIS — Z98.1 S/P LUMBAR FUSION: ICD-10-CM

## 2024-03-19 DIAGNOSIS — M51.36 DDD (DEGENERATIVE DISC DISEASE), LUMBAR: ICD-10-CM

## 2024-03-19 DIAGNOSIS — M47.817 LUMBOSACRAL SPONDYLOSIS WITHOUT MYELOPATHY: ICD-10-CM

## 2024-03-19 PROBLEM — M53.3 SACROILIAC DYSFUNCTION: Status: ACTIVE | Noted: 2017-06-28

## 2024-03-19 PROCEDURE — G8428 CUR MEDS NOT DOCUMENT: HCPCS | Performed by: ANESTHESIOLOGY

## 2024-03-19 PROCEDURE — 99214 OFFICE O/P EST MOD 30 MIN: CPT | Performed by: ANESTHESIOLOGY

## 2024-03-19 PROCEDURE — 99213 OFFICE O/P EST LOW 20 MIN: CPT | Performed by: ANESTHESIOLOGY

## 2024-03-19 PROCEDURE — G8484 FLU IMMUNIZE NO ADMIN: HCPCS | Performed by: ANESTHESIOLOGY

## 2024-03-19 PROCEDURE — 1036F TOBACCO NON-USER: CPT | Performed by: ANESTHESIOLOGY

## 2024-03-19 PROCEDURE — G8420 CALC BMI NORM PARAMETERS: HCPCS | Performed by: ANESTHESIOLOGY

## 2024-03-19 RX ORDER — SODIUM CHLORIDE 0.9 % (FLUSH) 0.9 %
5-40 SYRINGE (ML) INJECTION PRN
OUTPATIENT
Start: 2024-03-19

## 2024-03-19 RX ORDER — SODIUM CHLORIDE 0.9 % (FLUSH) 0.9 %
5-40 SYRINGE (ML) INJECTION EVERY 12 HOURS SCHEDULED
OUTPATIENT
Start: 2024-03-19

## 2024-03-19 RX ORDER — SODIUM CHLORIDE 9 MG/ML
INJECTION, SOLUTION INTRAVENOUS PRN
OUTPATIENT
Start: 2024-03-19

## 2024-03-19 NOTE — PROGRESS NOTES
Physical Therapy Treatment Note    Date: 3/19/2024  Patient Name: Ashley Espana  Daughter: Gauri.   : 1985   MRN: [id number]  DOInjury: chronic  DOSx: 11/3/2023  Referring Provider: Dunia Saez PA-C  [address] [place] [address]     Medical Diagnosis:      Diagnosis Orders   1. Acute midline low back pain with right-sided sciatica        2. S/P lumbar fusion        3. Sacroiliitis (HCC)            April is 3 months post lumbar fusion.  Her mobility is pretty good.  She does demonstrate R LE and trunk weakness.  She also voices fear avoidance and fear of re-injury.  We has a long discussion about back rehab, need for progressive exercise and loading to improve function, and possibility that leg function may not change or change very little due to the length of time since onset.       Outcome Measure:   Oswestry Low Back Disability Questionnaire 46% disability    Access Code: [email or web address]  URL: [email or web address]  Date: 2024  Prepared by: Paulina Hou    Exercises  - Hooklying Single Knee to Chest Stretch  - 1 x daily - 3-4 x weekly - 2 sets - 10 reps  - Supine Double Knee to Chest  - 1 x daily - 2 sets - 10 reps  - Supine Bridge  - 1 x daily - 2-3 sets - 15-20 reps  - Clamshell  - 1 x daily - 2 sets - 10 reps  - Sidelying Hip Abduction  - 1 x daily - 10 reps  - Seated Flexion Stretch  - 1 x daily - 2 sets - 10 reps  X = TO BE PERFORMED NEXT VISIT  > = PROGRESS TO THIS  Precaution: Osteopenia     S: pt  reports feeling sore again today .  .  O:   Time 4098-3479 am     Visit   Reference #: [id number]  80719- 0/36 units  21516- 10/36 units  06711- 0/36 units  3/6/2024 through 2024    Repeat outcome measure at mid point and end.    Pain Pain 5/10     ROM See eval     Modalities            Manual         MT   ROM  Pt given copies of all ex's 2024    SKTC   Copies given to perform all ex's 2 x 10 reps ea for HEP  NR   DKTC  NR   Seated flexion 1 x 10   NR         Exercise

## 2024-03-19 NOTE — PROGRESS NOTES
Ashley Espana presents to the [facility] on 3/19/2024. April is complaining of pain low back, radiates tor right leg. The pain is constant. The pain is described as aching, throbbing, stabbing, sharp, burning, and pinching with compression.. Pain is rated on her best day at a 2, on her worst day at a 7, and on average at a 4 on the VAS scale. She took her last dose of Neurontin, oxycodone, and Robaxin  and Extra Strength Tylenol. Last dose of Oxycodone was last Friday.      Any procedures since your last visit: Yes, LUMBAR TRANSFORAMINAL EPIDURAL STEROID INJECTION RIGHT SACRAL 1 UNDER FLUOROSCOPIC GUIDANCE with 50 % relief.    She is not on NSAIDS and  is not on anticoagulation medications to include none   Pacemaker or defibrillator: No.    Medication Contract and Consent for Opioid Use Documents Filed        No documents found                       LMP 03/20/2018      Patient's last menstrual period was 03/20/2018.

## 2024-03-19 NOTE — PROGRESS NOTES
[facility]  [address], [place] [address]  [phone]    Follow up Note      April D Bever     Date of Visit:  3/19/2024    CC:  Patient presents for follow up   Chief Complaint   Patient presents with    Follow-up     LUMBAR TRANSFORAMINAL EPIDURAL STEROID INJECTION RIGHT SACRAL 1 UNDER FLUOROSCOPIC GUIDANCE     HPI:  H/o low back and LE pain. Failed conservative treatment.    S/P right L5-S1 hemilaminectomy and discectomy on 4/27/2023 by Dr. aTlley.     Had recurrent disc herniation and subsequently underwent L5-S1 fusion in 11/3/2023.    Continues to have right sided low back pain and right LE pain.  Tingling/ numbness/ weakness + right LE.    Nursing notes and details of the pain history reviewed. Please see intake notes for details.    Previous treatments:   Physical Therapy : yes, continues HEP. Chiropractic treatment: yes     Medications: - NSAID's : yes                        - Membrane stabilizers : yes                       - Opioids : no                       - Adjuvants or Others : yes     Spine Surgeries: yes      Anticoagulation medications: no     H/o Smoking/ vaping: yes  H/o alcohol abuse : no  H/o Illicit drug use : CBD - daily. Occasional THC use +     Employment: Currently not working    Imaging studies:    X-ray LS spine: 2/1/2024:  IMPRESSION:  Posterior fusion of L5 and S1 with no hardware complication or malalignment.    MRI of LS spine: 6/10/2023:   ( Has undergone L5-S1 fusion in nov 2023 since  this MRI)  IMPRESSION:  Postoperative change as detailed above.  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  This suggests a recurrent disc extrusion with surrounding  scar/granulation tissue causing right lateral recess stenosis and mass effect  on the right S1 nerve root.    MRI of the lumbar spine 3/15/2023: (has undergone surgery since this MRI)  Impression   1. Large

## 2024-03-22 ENCOUNTER — TREATMENT (OUTPATIENT)
Dept: PHYSICAL THERAPY | Age: 39
End: 2024-03-22

## 2024-03-22 DIAGNOSIS — Z98.1 S/P LUMBAR FUSION: ICD-10-CM

## 2024-03-22 DIAGNOSIS — M46.1 SACROILIITIS (HCC): ICD-10-CM

## 2024-03-22 DIAGNOSIS — M54.41 ACUTE MIDLINE LOW BACK PAIN WITH RIGHT-SIDED SCIATICA: Primary | ICD-10-CM

## 2024-03-22 NOTE — PROGRESS NOTES
Physical Therapy Treatment Note    Date: 3/22/2024  Patient Name: Ashley Espana  Daughter: Gauri.   : 1985   MRN: [id number]  DOInjury: chronic  DOSx: 11/3/2023  Referring Provider: Dunia Saez PA-C  [address] [place] [address]     Medical Diagnosis:      Diagnosis Orders   1. Acute midline low back pain with right-sided sciatica        2. S/P lumbar fusion        3. Sacroiliitis (HCC)            April is 3 months post lumbar fusion.  Her mobility is pretty good.  She does demonstrate R LE and trunk weakness.  She also voices fear avoidance and fear of re-injury.  We has a long discussion about back rehab, need for progressive exercise and loading to improve function, and possibility that leg function may not change or change very little due to the length of time since onset.       Outcome Measure:   Oswestry Low Back Disability Questionnaire 46% disability    Access Code: [email or web address]  URL: [email or web address]  Date: 2024  Prepared by: Paulina Hou    Exercises  - Hooklying Single Knee to Chest Stretch  - 1 x daily - 3-4 x weekly - 2 sets - 10 reps  - Supine Double Knee to Chest  - 1 x daily - 2 sets - 10 reps  - Supine Bridge  - 1 x daily - 2-3 sets - 15-20 reps  - Clamshell  - 1 x daily - 2 sets - 10 reps  - Sidelying Hip Abduction  - 1 x daily - 10 reps  - Seated Flexion Stretch  - 1 x daily - 2 sets - 10 reps  X = TO BE PERFORMED NEXT VISIT  > = PROGRESS TO THIS  Precaution: Osteopenia     S: pt  reports feeling sore again today .  .  O:   Time 5136-6687 am     Visit   Reference #: [id number]  43577- 0/36 units  35308- 1236 units  78574- 0/36 units  3/6/2024 through 2024    Repeat outcome measure at mid point and end.    Pain Pain 5/10     ROM See eval     Modalities            Manual         MT   ROM  Pt given copies of all ex's 2024    SKTC   Copies given to perform all ex's 2 x 10 reps ea for HEP  NR   DKTC  NR   Seated flexion 1 x 10   NR         Exercise

## 2024-03-26 ENCOUNTER — TREATMENT (OUTPATIENT)
Dept: PHYSICAL THERAPY | Age: 39
End: 2024-03-26
Payer: COMMERCIAL

## 2024-03-26 ENCOUNTER — TELEPHONE (OUTPATIENT)
Dept: PAIN MANAGEMENT | Age: 39
End: 2024-03-26

## 2024-03-26 DIAGNOSIS — M46.1 SACROILIITIS (HCC): ICD-10-CM

## 2024-03-26 DIAGNOSIS — M54.41 ACUTE MIDLINE LOW BACK PAIN WITH RIGHT-SIDED SCIATICA: Primary | ICD-10-CM

## 2024-03-26 DIAGNOSIS — Z98.1 S/P LUMBAR FUSION: ICD-10-CM

## 2024-03-26 PROCEDURE — 97112 NEUROMUSCULAR REEDUCATION: CPT

## 2024-03-26 NOTE — PROGRESS NOTES
Physical Therapy Treatment Note    Date: 3/26/2024  Patient Name: Ashley Espana  Daughter: Gauri.   : 1985   MRN: [id number]  DOInjury: chronic  DOSx: 11/3/2023  Referring Provider: Dunia Saez PA-C  [address] [place] [address]     Medical Diagnosis:      Diagnosis Orders   1. Acute midline low back pain with right-sided sciatica        2. S/P lumbar fusion        3. Sacroiliitis (HCC)            April is 3 months post lumbar fusion.  Her mobility is pretty good.  She does demonstrate R LE and trunk weakness.  She also voices fear avoidance and fear of re-injury.  We has a long discussion about back rehab, need for progressive exercise and loading to improve function, and possibility that leg function may not change or change very little due to the length of time since onset.       Outcome Measure:   Oswestry Low Back Disability Questionnaire 46% disability    Access Code: [email or web address]  URL: [email or web address]  Date: 2024  Prepared by: Paulina Hou    Exercises  - Hooklying Single Knee to Chest Stretch  - 1 x daily - 3-4 x weekly - 2 sets - 10 reps  - Supine Double Knee to Chest  - 1 x daily - 2 sets - 10 reps  - Supine Bridge  - 1 x daily - 2-3 sets - 15-20 reps  - Clamshell  - 1 x daily - 2 sets - 10 reps  - Sidelying Hip Abduction  - 1 x daily - 10 reps  - Seated Flexion Stretch  - 1 x daily - 2 sets - 10 reps  X = TO BE PERFORMED NEXT VISIT  > = PROGRESS TO THIS  Precaution: Osteopenia     S: pt  reports feeling sore again today , no real new changes  .  .  O:   Time 7999-9989 am     Visit   Reference #: [id number]  60390- 0/36 units  40235- 14/36 units  68652- 0/36 units  3/6/2024 through 2024    Repeat outcome measure at mid point and end.    Pain Pain 5/10     ROM See eval     Modalities            Manual         MT   ROM  Pt given copies of all ex's 2024    SKTC   Copies given to perform all ex's 2 x 10 reps ea for HEP  NR   DKTC  NR   Seated flexion 1 x 10

## 2024-03-26 NOTE — TELEPHONE ENCOUNTER
April called today requesting pain medication. Currently taking Gabapentin, Robaxin, Tylenol and Advil. This is not providing relief. Oxycodone last filled 1/31/24, OARRS is consistent. Last visit was 3/19/24, next appt is 5/14/24. Thank you

## 2024-03-27 ENCOUNTER — TELEMEDICINE (OUTPATIENT)
Dept: PAIN MANAGEMENT | Age: 39
End: 2024-03-27
Payer: COMMERCIAL

## 2024-03-27 DIAGNOSIS — M51.36 DDD (DEGENERATIVE DISC DISEASE), LUMBAR: Primary | ICD-10-CM

## 2024-03-27 DIAGNOSIS — M54.16 LUMBAR RADICULAR PAIN: ICD-10-CM

## 2024-03-27 DIAGNOSIS — Z98.1 S/P LUMBAR FUSION: ICD-10-CM

## 2024-03-27 DIAGNOSIS — M53.3 SACROILIAC DYSFUNCTION: ICD-10-CM

## 2024-03-27 DIAGNOSIS — M96.1 POSTLAMINECTOMY SYNDROME, LUMBAR: ICD-10-CM

## 2024-03-27 PROCEDURE — G8484 FLU IMMUNIZE NO ADMIN: HCPCS | Performed by: ANESTHESIOLOGY

## 2024-03-27 PROCEDURE — 1036F TOBACCO NON-USER: CPT | Performed by: ANESTHESIOLOGY

## 2024-03-27 PROCEDURE — 99213 OFFICE O/P EST LOW 20 MIN: CPT | Performed by: ANESTHESIOLOGY

## 2024-03-27 PROCEDURE — G8420 CALC BMI NORM PARAMETERS: HCPCS | Performed by: ANESTHESIOLOGY

## 2024-03-27 PROCEDURE — G8428 CUR MEDS NOT DOCUMENT: HCPCS | Performed by: ANESTHESIOLOGY

## 2024-03-27 RX ORDER — OXYCODONE HYDROCHLORIDE AND ACETAMINOPHEN 5; 325 MG/1; MG/1
0.5 TABLET ORAL 2 TIMES DAILY PRN
Qty: 20 TABLET | Refills: 0 | Status: SHIPPED | OUTPATIENT
Start: 2024-03-27 | End: 2024-04-26

## 2024-03-27 NOTE — H&P (VIEW-ONLY)
3/27/2024    TELEHEALTH EVALUATION -- Audio/Visual    HPI:    Ashley Espana (:  1985) has requested an audio/video evaluation for the following concern(s):    Low back/ LE pain.    H/o low back and LE pain. Failed conservative treatment.     S/P right L5-S1 hemilaminectomy and discectomy on 2023 by Dr. Talley.      Had recurrent disc herniation and subsequently underwent L5-S1 fusion in 11/3/2023.     Continues to have right sided low back pain and right LE pain.  Tingling/ numbness/ weakness + right LE.     Nursing notes and details of the pain history reviewed. Please see intake notes for details.     Previous treatments:   Physical Therapy : yes, continues HEP. Chiropractic treatment: yes     Medications: - NSAID's : yes                        - Membrane stabilizers : yes                       - Opioids : no                       - Adjuvants or Others : yes     Spine Surgeries: yes      Anticoagulation medications: no     H/o Smoking/ vaping: yes  H/o alcohol abuse : no  H/o Illicit drug use : CBD - daily. Occasional THC use +     Employment: Currently not working     Imaging studies:     X-ray LS spine: 2024:  IMPRESSION:  Posterior fusion of L5 and S1 with no hardware complication or malalignment.     MRI of LS spine: 6/10/2023:   ( Has undergone L5-S1 fusion in 2023 since  this MRI)  IMPRESSION:  Postoperative change as detailed above.  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  This suggests a recurrent disc extrusion with surrounding  scar/granulation tissue causing right lateral recess stenosis and mass effect  on the right S1 nerve root.     MRI of the lumbar spine 3/15/2023: (has undergone surgery since this MRI)  Impression   1. Large right paracentral disc protrusion at L5-S1 resulting in severe   stenoses of the central canal and right lateral recess.  It results in   impingement of multiple nerve roots,

## 2024-03-27 NOTE — PROGRESS NOTES
Facial Asymmetry (Cranial nerve 7 motor function) (limited exam to video visit)           Skin:        [x] No significant exanthematous lesions or discoloration noted on facial skin             Psychiatric:       [x] Normal Affect     Other pertinent observable physical exam findings-     ASSESSMENT/PLAN:       Diagnosis Orders   1. Sacroiliac dysfunction          2. DDD (degenerative disc disease), lumbar          3. Lumbar radicular pain          4. Postlaminectomy syndrome, lumbar          5. Lumbosacral spondylosis without myelopathy             39 y.o.  female with H/o low back and right LE pain.     Tried conservative treatment.     S/p Hemilaminectomy and discectomy by Dr. Talley on 4/27/2023.     Had recurrent discontinuation for which she has subsequently underwent L5-S1 fusion on 11/3/2023 by Dr. Talley.     Was on gabapentin and oxycodone prn. She was taking 0.5 tab of Oxycodone Only prn.     OARRS reviewed: Last oxycodone filled on 1/31/2024.    Prior and recent Image findings reviewed.     NSG notes reviewed.     S/p Right S1 TFESI > 50% relief of LE pain.     Current pain over the right SIJ and right LE. SIJ signs +     PLAN:     Right SIJ injection under fluoroscopy. RBA discussed- patient agreed.     2 weeks later caudal MILLY under fluoroscopy to help the right LE pain. DIFFERENT TECHNIQUE. RBA discussed - patient agreed.     Pain meds for prn use. RBA of opioid use discussed.     Gabapentin- takes low dose- higher doses causes - drowsiness.    Methocarbamol for prn use.    Short course of percocet for prn use - she takes 0.5 tab bid. # 20 tabs E scribed. RBA of opioid use discussed.    PT / myofacial release.     Counseling done regarding importance of regular exercise program walking cessation.     Discussed importance of smoking/ vaping cessation on spine health.     We discussed with the patient that combining opioids, benzodiazepines, alcohol, illicit drugs or sleep aids increases the risk of

## 2024-03-28 ENCOUNTER — TREATMENT (OUTPATIENT)
Dept: PHYSICAL THERAPY | Age: 39
End: 2024-03-28
Payer: COMMERCIAL

## 2024-03-28 DIAGNOSIS — Z98.1 S/P LUMBAR FUSION: ICD-10-CM

## 2024-03-28 DIAGNOSIS — M46.1 SACROILIITIS (HCC): ICD-10-CM

## 2024-03-28 DIAGNOSIS — M54.41 ACUTE MIDLINE LOW BACK PAIN WITH RIGHT-SIDED SCIATICA: Primary | ICD-10-CM

## 2024-03-28 PROCEDURE — 97112 NEUROMUSCULAR REEDUCATION: CPT

## 2024-03-28 NOTE — PROGRESS NOTES
Physical Therapy Treatment Note    Date: 3/28/2024  Patient Name: Ashley Espana  Daughter: Gauri.   : 1985   MRN: [id number]  DOInjury: chronic  DOSx: 11/3/2023  Referring Provider: Dunia Saez PA-C  [address] [place] [address]     Medical Diagnosis:      Diagnosis Orders   1. Acute midline low back pain with right-sided sciatica        2. S/P lumbar fusion        3. Sacroiliitis (HCC)            April is 3 months post lumbar fusion.  Her mobility is pretty good.  She does demonstrate R LE and trunk weakness.  She also voices fear avoidance and fear of re-injury.  We has a long discussion about back rehab, need for progressive exercise and loading to improve function, and possibility that leg function may not change or change very little due to the length of time since onset.       Outcome Measure:   Oswestry Low Back Disability Questionnaire 46% disability    Access Code: [email or web address]  URL: [email or web address]  Date: 2024  Prepared by: Paulina Hou    Exercises  - Hooklying Single Knee to Chest Stretch  - 1 x daily - 3-4 x weekly - 2 sets - 10 reps  - Supine Double Knee to Chest  - 1 x daily - 2 sets - 10 reps  - Supine Bridge  - 1 x daily - 2-3 sets - 15-20 reps  - Clamshell  - 1 x daily - 2 sets - 10 reps  - Sidelying Hip Abduction  - 1 x daily - 10 reps  - Seated Flexion Stretch  - 1 x daily - 2 sets - 10 reps  X = TO BE PERFORMED NEXT VISIT  > = PROGRESS TO THIS  Precaution: Osteopenia     S: pt  reports feeling sore again today , no real new changes  .  .  O:   Time 1643-8897 am     Visit 10 /8-12  Reference #: [id number]  54266- 0/36 units  60014- 16/36 units  84958- 0/36 units  3/6/2024 through 2024    Repeat outcome measure at mid point and end.    Pain Pain 5/10     ROM See eval     Modalities            Manual         MT   ROM  Pt given copies of all ex's 2024    SKTC   Copies given to perform all ex's 2 x 10 reps ea for HEP  NR   DKTC  NR   Seated flexion 1 x

## 2024-04-03 ENCOUNTER — TREATMENT (OUTPATIENT)
Dept: PHYSICAL THERAPY | Age: 39
End: 2024-04-03
Payer: COMMERCIAL

## 2024-04-03 DIAGNOSIS — M54.41 ACUTE MIDLINE LOW BACK PAIN WITH RIGHT-SIDED SCIATICA: Primary | ICD-10-CM

## 2024-04-03 PROCEDURE — 97112 NEUROMUSCULAR REEDUCATION: CPT

## 2024-04-03 PROCEDURE — 97110 THERAPEUTIC EXERCISES: CPT

## 2024-04-03 NOTE — PROGRESS NOTES
Physical Therapy Treatment Note    Date: 4/3/2024  Patient Name: Ashley Espana  Daughter: Gauri.   : 1985   MRN: [id number]  DOInjury: chronic  DOSx: 11/3/2023  Referring Provider: Dunia Saez PA-C  [address] [place] [address]     Medical Diagnosis:      Diagnosis Orders   1. Acute midline low back pain with right-sided sciatica            April is 3 months post lumbar fusion.  Her mobility is pretty good.  She does demonstrate R LE and trunk weakness.  She also voices fear avoidance and fear of re-injury.  We has a long discussion about back rehab, need for progressive exercise and loading to improve function, and possibility that leg function may not change or change very little due to the length of time since onset.       Outcome Measure:   Oswestry Low Back Disability Questionnaire 46% disability    Access Code: [email or web address]  URL: [email or web address]  Date: 2024  Prepared by: Paulina Hou    Exercises  - Hooklying Single Knee to Chest Stretch  - 1 x daily - 3-4 x weekly - 2 sets - 10 reps  - Supine Double Knee to Chest  - 1 x daily - 2 sets - 10 reps  - Supine Bridge  - 1 x daily - 2-3 sets - 15-20 reps  - Clamshell  - 1 x daily - 2 sets - 10 reps  - Sidelying Hip Abduction  - 1 x daily - 10 reps  - Seated Flexion Stretch  - 1 x daily - 2 sets - 10 reps  X = TO BE PERFORMED NEXT VISIT  > = PROGRESS TO THIS  Precaution: Osteopenia     S: pt  reports feeling sore again start of session , maybe a little better end of session .  .  .  O:   Time 9067-2290 am     Visit 11 visit   Reference #: [id number]  11258- 2/36 units TE  75460- 18/36 units NR  43855- 0/36 units TA  3/6/2024 through 2024    Repeat outcome measure at mid point and end.    Pain Pain 3/10     ROM See eval     Modalities            Manual X 15 min  NR   Soft tissues mobilization       ROM  Pt given copies of all ex's 2024    SKTC 1 x 10  Copies given to perform all ex's 2 x 10 reps ea for HEP  NR   DKTC 1 x

## 2024-04-05 ENCOUNTER — TREATMENT (OUTPATIENT)
Dept: PHYSICAL THERAPY | Age: 39
End: 2024-04-05

## 2024-04-05 DIAGNOSIS — M54.41 ACUTE MIDLINE LOW BACK PAIN WITH RIGHT-SIDED SCIATICA: Primary | ICD-10-CM

## 2024-04-05 NOTE — PROGRESS NOTES
Physical Therapy Treatment Note    Date: 2024  Patient Name: Ashley Espana  Daughter: Gauri.   : 1985   MRN: [id number]  DOInjury: chronic  DOSx: 11/3/2023  Referring Provider: Dunia Saez PA-C  [address] [place] [address]     Medical Diagnosis:      Diagnosis Orders   1. Acute midline low back pain with right-sided sciatica            April is 3 months post lumbar fusion.  Her mobility is pretty good.  She does demonstrate R LE and trunk weakness.  She also voices fear avoidance and fear of re-injury.  We has a long discussion about back rehab, need for progressive exercise and loading to improve function, and possibility that leg function may not change or change very little due to the length of time since onset.       Outcome Measure:   Oswestry Low Back Disability Questionnaire 46% disability    Access Code: [email or web address]  URL: [email or web address]  Date: 2024  Prepared by: Paulina Hou    Exercises  - Hooklying Single Knee to Chest Stretch  - 1 x daily - 3-4 x weekly - 2 sets - 10 reps  - Supine Double Knee to Chest  - 1 x daily - 2 sets - 10 reps  - Supine Bridge  - 1 x daily - 2-3 sets - 15-20 reps  - Clamshell  - 1 x daily - 2 sets - 10 reps  - Sidelying Hip Abduction  - 1 x daily - 10 reps  - Seated Flexion Stretch  - 1 x daily - 2 sets - 10 reps  X = TO BE PERFORMED NEXT VISIT  > = PROGRESS TO THIS  Precaution: Osteopenia     S: pt  reports feeling sore again start of session ,  a lot better end of session .  .  .  O:   Time 9520-5132 am     Visit 12 visit   Reference #: [id number]  [id number]- 2/36 units TE  [id number]- 20/36 units NR  [id number]- 0/36 units TA  3/6/2024 through 2024    Repeat outcome measure at mid point and end.    Pain Pain 3/10     ROM See eval     Modalities               NR   Soft tissues mobilization  X 15 min     ROM  Pt given copies of all ex's 2024    SKTC 1 x 10  Copies given to perform all ex's 2 x 10 reps ea for HEP  NR   DKTC 1 x 10   NR

## 2024-04-08 ENCOUNTER — TREATMENT (OUTPATIENT)
Dept: PHYSICAL THERAPY | Age: 39
End: 2024-04-08
Payer: COMMERCIAL

## 2024-04-08 ENCOUNTER — PREP FOR PROCEDURE (OUTPATIENT)
Dept: PAIN MANAGEMENT | Age: 39
End: 2024-04-08

## 2024-04-08 DIAGNOSIS — M54.41 ACUTE MIDLINE LOW BACK PAIN WITH RIGHT-SIDED SCIATICA: Primary | ICD-10-CM

## 2024-04-08 DIAGNOSIS — M53.3 DISORDER OF SACRUM: ICD-10-CM

## 2024-04-08 PROCEDURE — 97110 THERAPEUTIC EXERCISES: CPT

## 2024-04-08 RX ORDER — METHOCARBAMOL 500 MG/1
500 TABLET, FILM COATED ORAL 3 TIMES DAILY
COMMUNITY
Start: 2024-04-03

## 2024-04-08 NOTE — PROGRESS NOTES
Physical Therapy Treatment Note    Date: 2024  Patient Name: Ashley Espana  Daughter: Gauri.   : 1985   MRN: [id number]  DOInjury: chronic  DOSx: 11/3/2023  Referring Provider: Dunia Saez PA-C  [address] [place] [address]     Medical Diagnosis:      Diagnosis Orders   1. Acute midline low back pain with right-sided sciatica            April is 3 months post lumbar fusion.  Her mobility is pretty good.  She does demonstrate R LE and trunk weakness.  She also voices fear avoidance and fear of re-injury.  We has a long discussion about back rehab, need for progressive exercise and loading to improve function, and possibility that leg function may not change or change very little due to the length of time since onset.       Outcome Measure:   Oswestry Low Back Disability Questionnaire 46% disability    Access Code: [email or web address]  URL: [email or web address]  Date: 2024  Prepared by: Paulina Hou    Exercises  - Hooklying Single Knee to Chest Stretch  - 1 x daily - 3-4 x weekly - 2 sets - 10 reps  - Supine Double Knee to Chest  - 1 x daily - 2 sets - 10 reps  - Supine Bridge  - 1 x daily - 2-3 sets - 15-20 reps  - Clamshell  - 1 x daily - 2 sets - 10 reps  - Sidelying Hip Abduction  - 1 x daily - 10 reps  - Seated Flexion Stretch  - 1 x daily - 2 sets - 10 reps  X = TO BE PERFORMED NEXT VISIT  > = PROGRESS TO THIS  Precaution: Osteopenia     S: pt  reports overall she is feeling better now longer   . O:   Time 2351-7141 am     Visit 13 visit   Reference #: [id number]  [id number]- 4/36 units TE  [id number]- 20/36 units NR  [id number]- 0/36 units TA  3/6/2024 through 2024    Repeat outcome measure at mid point and end.    Pain Pain 3/10     ROM See eval     Modalities               NR   Soft tissues mobilization      ROM  Pt given copies of all ex's 2024    SKTC 1 x 10  Copies given to perform all ex's 2 x 10 reps ea for HEP  NR   DKTC 1 x 10   NR   Seated flexion 1 x 10 performed between ex's

## 2024-04-09 ENCOUNTER — HOSPITAL ENCOUNTER (OUTPATIENT)
Age: 39
Setting detail: OUTPATIENT SURGERY
Discharge: HOME | End: 2024-04-09
Attending: ANESTHESIOLOGY | Admitting: ANESTHESIOLOGY
Payer: COMMERCIAL

## 2024-04-09 ENCOUNTER — HOSPITAL ENCOUNTER (OUTPATIENT)
Dept: OPERATING ROOM | Age: 39
Setting detail: OUTPATIENT SURGERY
Discharge: HOME | End: 2024-04-09
Attending: ANESTHESIOLOGY
Payer: COMMERCIAL

## 2024-04-09 VITALS
BODY MASS INDEX: 24.29 KG/M2 | DIASTOLIC BLOOD PRESSURE: 64 MMHG | HEART RATE: 60 BPM | SYSTOLIC BLOOD PRESSURE: 127 MMHG | TEMPERATURE: 98.7 F | RESPIRATION RATE: 16 BRPM | OXYGEN SATURATION: 100 % | HEIGHT: 62 IN | WEIGHT: 132 LBS

## 2024-04-09 DIAGNOSIS — M46.1 SACROILIITIS (HCC): ICD-10-CM

## 2024-04-09 DIAGNOSIS — M46.1 SACROILIITIS (HCC): Primary | ICD-10-CM

## 2024-04-09 PROCEDURE — 7100000010 HC PHASE II RECOVERY - FIRST 15 MIN: Performed by: ANESTHESIOLOGY

## 2024-04-09 PROCEDURE — 6360000004 HC RX CONTRAST MEDICATION: Performed by: ANESTHESIOLOGY

## 2024-04-09 PROCEDURE — 3600000005 HC SURGERY LEVEL 5 BASE: Performed by: ANESTHESIOLOGY

## 2024-04-09 PROCEDURE — 27096 INJECT SACROILIAC JOINT: CPT | Performed by: ANESTHESIOLOGY

## 2024-04-09 PROCEDURE — 6360000002 HC RX W HCPCS: Performed by: ANESTHESIOLOGY

## 2024-04-09 PROCEDURE — 2709999900 HC NON-CHARGEABLE SUPPLY: Performed by: ANESTHESIOLOGY

## 2024-04-09 PROCEDURE — 2500000003 HC RX 250 WO HCPCS: Performed by: ANESTHESIOLOGY

## 2024-04-09 PROCEDURE — 7100000011 HC PHASE II RECOVERY - ADDTL 15 MIN: Performed by: ANESTHESIOLOGY

## 2024-04-09 RX ORDER — LIDOCAINE HYDROCHLORIDE 5 MG/ML
INJECTION, SOLUTION INFILTRATION; INTRAVENOUS PRN
Status: DISCONTINUED | OUTPATIENT
Start: 2024-04-09 | End: 2024-04-09 | Stop reason: ALTCHOICE

## 2024-04-09 RX ORDER — SODIUM CHLORIDE 9 MG/ML
INJECTION, SOLUTION INTRAVENOUS PRN
Status: DISCONTINUED | OUTPATIENT
Start: 2024-04-09 | End: 2024-04-09 | Stop reason: HOSPADM

## 2024-04-09 RX ORDER — BUPIVACAINE HYDROCHLORIDE 2.5 MG/ML
INJECTION, SOLUTION EPIDURAL; INFILTRATION; INTRACAUDAL PRN
Status: DISCONTINUED | OUTPATIENT
Start: 2024-04-09 | End: 2024-04-09 | Stop reason: ALTCHOICE

## 2024-04-09 RX ORDER — METHYLPREDNISOLONE ACETATE 40 MG/ML
INJECTION, SUSPENSION INTRA-ARTICULAR; INTRALESIONAL; INTRAMUSCULAR; SOFT TISSUE PRN
Status: DISCONTINUED | OUTPATIENT
Start: 2024-04-09 | End: 2024-04-09 | Stop reason: ALTCHOICE

## 2024-04-09 RX ORDER — SODIUM CHLORIDE 0.9 % (FLUSH) 0.9 %
5-40 SYRINGE (ML) INJECTION EVERY 12 HOURS SCHEDULED
Status: DISCONTINUED | OUTPATIENT
Start: 2024-04-09 | End: 2024-04-09 | Stop reason: HOSPADM

## 2024-04-09 RX ORDER — SODIUM CHLORIDE 0.9 % (FLUSH) 0.9 %
5-40 SYRINGE (ML) INJECTION PRN
Status: DISCONTINUED | OUTPATIENT
Start: 2024-04-09 | End: 2024-04-09 | Stop reason: HOSPADM

## 2024-04-09 ASSESSMENT — PAIN - FUNCTIONAL ASSESSMENT
PAIN_FUNCTIONAL_ASSESSMENT: NONE - DENIES PAIN
PAIN_FUNCTIONAL_ASSESSMENT: NONE - DENIES PAIN
PAIN_FUNCTIONAL_ASSESSMENT: 0-10

## 2024-04-09 ASSESSMENT — PAIN DESCRIPTION - DESCRIPTORS: DESCRIPTORS: ACHING

## 2024-04-09 NOTE — DISCHARGE INSTRUCTIONS
swelling, warmth, or redness in the area.  Red streaks leading from the area.  Pus draining from the wound.  A new or higher fever.   Watch closely for changes in your health, and be sure to contact your doctor if you have any problems.  Where can you learn more?  Go to https://www.TimeGenius.net/patientEd and enter C340 to learn more about \"Infection After Surgery: Care Instructions.\"  Current as of: July 26, 2023               Content Version: 14.0  © 2006-2024 [employer].   Care instructions adapted under license by [employer]. If you have questions about a medical condition or this instruction, always ask your healthcare professional. [employer] disclaims any warranty or liability for your use of this information.

## 2024-04-09 NOTE — INTERVAL H&P NOTE
Update History & Physical    The patient's History and Physical of March 27, 2024 was reviewed with the patient and I examined the patient. There was no change. The surgical site was confirmed by the patient and me.     Plan: The risks, benefits, expected outcome, and alternative to the recommended procedure have been discussed with the patient. Patient understands and wants to proceed with the procedure.     Electronically signed by Sajan Sandoval MD on 4/9/2024

## 2024-04-09 NOTE — OP NOTE
Operative Note      Patient: Ashley Espana  YOB: 1985  MRN: [id number]    Date of Procedure: 2024    Pre-Op Diagnosis Codes:     * Disorder of sacrum [M53.3]    Post-Op Diagnosis: Same       Procedure(s):  RIGHT SACROILIAC JOINT INJECTION UNDER FLUOROSCOPIC GUIDANCE    Surgeon(s):  Sajan Sandoval MD    Assistant:   * No surgical staff found *    Anesthesia: Local    Estimated Blood Loss (mL): Minimal    Complications: None    Specimens:   * No specimens in log *    Implants:  * No implants in log *      Drains: * No LDAs found *    Findings:  Good needle placement    Detailed Description of Procedure:   2024    Patient: Ashley Espana  :  1985  Age:  39 y.o.  Sex:  female     PRE-OPERATIVE DIAGNOSIS:   Right  sacroiliac dysfunction     POST-OPERATIVE DIAGNOSIS: Same.    PROCEDURE:  Fluoroscopic guided Right   sacroiliac joint injection with steroid.    SURGEON:  Sajan Sandoval MD    ANESTHESIA: Local    ESTIMATED BLOOD LOSS: None.  ______________________________________________________________________  BRIEF HISTORY:  Ashley Espana comes in today for  Right sacroiliac joint injection under fluoroscopic guidance. The potential complications as well as the procedure in detail were explained to her today. She has elected to undergo the aforementioned procedure.    April's complete History & Physical examination were reviewed in depth, a copy of which is in the chart.      DESCRIPTION OF PROCEDURE:    After confirming written and informed consent, a time-out was performed and April’s name and date of birth, the procedure to be performed as well as the plan for the location of the needle insertion were confirmed.    The patient was brought into the procedure room and placed in the prone position on the fluoroscopy table. Standard monitors were placed and vital signs were observed throughout the procedure. The low back and upper buttocks area was prepped with chloraprep and draped in

## 2024-04-10 ENCOUNTER — TREATMENT (OUTPATIENT)
Dept: PHYSICAL THERAPY | Age: 39
End: 2024-04-10
Payer: COMMERCIAL

## 2024-04-10 DIAGNOSIS — M54.41 ACUTE MIDLINE LOW BACK PAIN WITH RIGHT-SIDED SCIATICA: Primary | ICD-10-CM

## 2024-04-10 PROCEDURE — 97110 THERAPEUTIC EXERCISES: CPT

## 2024-04-10 NOTE — PROGRESS NOTES
Physical Therapy Treatment Note    Date: 4/10/2024  Patient Name: Ashley Espana  Daughter: Gauri.   : 1985   MRN: [id number]  DOInjury: chronic  DOSx: 11/3/2023  Referring Provider: Dunia Saez PA-C  [address] [place] [address]     Medical Diagnosis:      Diagnosis Orders   1. Acute midline low back pain with right-sided sciatica            April is 3 months post lumbar fusion.  Her mobility is pretty good.  She does demonstrate R LE and trunk weakness.  She also voices fear avoidance and fear of re-injury.  We has a long discussion about back rehab, need for progressive exercise and loading to improve function, and possibility that leg function may not change or change very little due to the length of time since onset.       Outcome Measure:   Oswestry Low Back Disability Questionnaire 46% disability    Access Code: [email or web address]  URL: [email or web address]  Date: 2024  Prepared by: Paulina Hou    Exercises  - Hooklying Single Knee to Chest Stretch  - 1 x daily - 3-4 x weekly - 2 sets - 10 reps  - Supine Double Knee to Chest  - 1 x daily - 2 sets - 10 reps  - Supine Bridge  - 1 x daily - 2-3 sets - 15-20 reps  - Clamshell  - 1 x daily - 2 sets - 10 reps  - Sidelying Hip Abduction  - 1 x daily - 10 reps  - Seated Flexion Stretch  - 1 x daily - 2 sets - 10 reps  X = TO BE PERFORMED NEXT VISIT  > = PROGRESS TO THIS  Precaution: Osteopenia     S: pt  reports being sore after having a pain relief procedure yesterday from pain clinic   , . O:   Time 3527-1756 am     Visit 13 visit   Reference #: [id number]  [id number]- 6/36 units TE  [id number]- 20/36 units NR  [id number]- 0/36 units TA  3/6/2024 through 2024    Repeat outcome measure at mid point and end.    Pain Pain 3/10     ROM See eval     Modalities               NR   Soft tissues mobilization      ROM  Pt given copies of all ex's 2024    SKTC 1 x 10  Copies given to perform all ex's 2 x 10 reps ea for HEP  NR   DKTC 1 x 10   NR   Seated

## 2024-04-15 ENCOUNTER — TELEPHONE (OUTPATIENT)
Dept: PHYSICAL THERAPY | Age: 39
End: 2024-04-15

## 2024-04-17 ENCOUNTER — TREATMENT (OUTPATIENT)
Dept: PHYSICAL THERAPY | Age: 39
End: 2024-04-17
Payer: COMMERCIAL

## 2024-04-17 DIAGNOSIS — M54.41 ACUTE MIDLINE LOW BACK PAIN WITH RIGHT-SIDED SCIATICA: Primary | ICD-10-CM

## 2024-04-17 PROCEDURE — 97110 THERAPEUTIC EXERCISES: CPT

## 2024-04-17 NOTE — PROGRESS NOTES
Physical Therapy Treatment Note    Date: 2024  Patient Name: Ashley Espana  Daughter: Gauri.   : 1985   MRN: [id number]  DOInjury: chronic  DOSx: 11/3/2023  Referring Provider: Dunia Saez PA-C  [address] [place] [address]     Medical Diagnosis:      Diagnosis Orders   1. Acute midline low back pain with right-sided sciatica            April is 3 months post lumbar fusion.  Her mobility is pretty good.  She does demonstrate R LE and trunk weakness.  She also voices fear avoidance and fear of re-injury.  We has a long discussion about back rehab, need for progressive exercise and loading to improve function, and possibility that leg function may not change or change very little due to the length of time since onset.       Outcome Measure:   Oswestry Low Back Disability Questionnaire 46% disability    Access Code: [email or web address]  URL: [email or web address]  Date: 2024  Prepared by: Paulina Hou    Exercises  - Hooklying Single Knee to Chest Stretch  - 1 x daily - 3-4 x weekly - 2 sets - 10 reps  - Supine Double Knee to Chest  - 1 x daily - 2 sets - 10 reps  - Supine Bridge  - 1 x daily - 2-3 sets - 15-20 reps  - Clamshell  - 1 x daily - 2 sets - 10 reps  - Sidelying Hip Abduction  - 1 x daily - 10 reps  - Seated Flexion Stretch  - 1 x daily - 2 sets - 10 reps  X = TO BE PERFORMED NEXT VISIT  > = PROGRESS TO THIS  Precaution: Osteopenia     S: pt  reports feeing overall better   , . O:   Time 4728-4216 am     Visit 14 visit   Reference #: [id number]  [id number]- 8/36 units TE  [id number]- 20/36 units NR  86518- 0/36 units TA  3/6/2024 through 2024    Repeat outcome measure at mid point and end.    Pain Pain 3/10     ROM See eval     Modalities               NR   Soft tissues mobilization      ROM  Pt given copies of all ex's 2024    SKTC 2 x 10  Copies given to perform all ex's 2 x 10 reps ea for HEP  NR   DKTC 2 x 10   NR   Seated flexion 2 x 10   NR         Exercise        Pt given

## 2024-04-22 ENCOUNTER — TELEPHONE (OUTPATIENT)
Dept: PHYSICAL THERAPY | Age: 39
End: 2024-04-22

## 2024-04-22 NOTE — TELEPHONE ENCOUNTER
Pt cancelled rx session due to reporting not feeling well with a sinus infection .   Pt scheduled for next appt's .

## 2024-04-23 ENCOUNTER — TELEPHONE (OUTPATIENT)
Dept: PAIN MANAGEMENT | Age: 39
End: 2024-04-23

## 2024-04-23 NOTE — TELEPHONE ENCOUNTER
April is requesting a refill of Percocet. Last filled 3/27/24, OARRS is consistent. Last visit was 4/9/24, next appt is 5/31/24. Thank you

## 2024-04-24 ENCOUNTER — OFFICE VISIT (OUTPATIENT)
Dept: PAIN MANAGEMENT | Age: 39
End: 2024-04-24
Payer: COMMERCIAL

## 2024-04-24 VITALS
TEMPERATURE: 97.5 F | BODY MASS INDEX: 24.29 KG/M2 | DIASTOLIC BLOOD PRESSURE: 70 MMHG | WEIGHT: 132 LBS | RESPIRATION RATE: 18 BRPM | SYSTOLIC BLOOD PRESSURE: 110 MMHG | HEART RATE: 96 BPM | HEIGHT: 62 IN | OXYGEN SATURATION: 98 %

## 2024-04-24 DIAGNOSIS — Z98.1 S/P LUMBAR FUSION: Primary | ICD-10-CM

## 2024-04-24 DIAGNOSIS — M47.817 LUMBOSACRAL SPONDYLOSIS WITHOUT MYELOPATHY: ICD-10-CM

## 2024-04-24 DIAGNOSIS — M51.36 DDD (DEGENERATIVE DISC DISEASE), LUMBAR: ICD-10-CM

## 2024-04-24 DIAGNOSIS — M54.16 LUMBAR RADICULAR PAIN: ICD-10-CM

## 2024-04-24 PROCEDURE — 99214 OFFICE O/P EST MOD 30 MIN: CPT | Performed by: ANESTHESIOLOGY

## 2024-04-24 PROCEDURE — 99213 OFFICE O/P EST LOW 20 MIN: CPT | Performed by: ANESTHESIOLOGY

## 2024-04-24 PROCEDURE — 1036F TOBACCO NON-USER: CPT | Performed by: ANESTHESIOLOGY

## 2024-04-24 PROCEDURE — G8427 DOCREV CUR MEDS BY ELIG CLIN: HCPCS | Performed by: ANESTHESIOLOGY

## 2024-04-24 PROCEDURE — G8420 CALC BMI NORM PARAMETERS: HCPCS | Performed by: ANESTHESIOLOGY

## 2024-04-24 RX ORDER — OXYCODONE HYDROCHLORIDE AND ACETAMINOPHEN 5; 325 MG/1; MG/1
0.5 TABLET ORAL 2 TIMES DAILY PRN
Qty: 20 TABLET | Refills: 0 | Status: SHIPPED | OUTPATIENT
Start: 2024-04-24 | End: 2024-05-24

## 2024-04-24 NOTE — PROGRESS NOTES
Ashley Espana presents to the [facility] on 4/24/2024. April is complaining of pain sacral, radiates to right leg. The pain is constant. The pain is described as aching, shooting, burning, numb, and pinching, tightness and muscle spasms. Pain is rated on her best day at a 3, on her worst day at a 8, and on average at a 6 on the VAS scale. She took her last dose of Neurontin and Robaxin, LD Percocet was yesterday  today.      Any procedures since your last visit: Yes, RIGHT SACROILIAC JOINT INJECTION with 20 % relief.    She is not on NSAIDS and  is not on anticoagulation medications to include none   Pacemaker or defibrillator: No   Medication Contract and Consent for Opioid Use Documents Filed        No documents found                       Resp 18   Ht 1.575 m (5' 2.01\")   Wt 59.9 kg (132 lb)   LMP 03/20/2018   BMI 24.14 kg/m²      Patient's last menstrual period was 03/20/2018.    
these medications as prescribed. The patient verbalizes understanding.     MD HEATHER Bolton Daniel Anthony, MD

## 2024-04-24 NOTE — H&P (VIEW-ONLY)
[facility]        [address] [place] [address]  Dept: [phone]      Follow up Note      April D Bever     Date of Visit:  4/24/2024    CC:  Patient presents for follow up   Chief Complaint   Patient presents with    Follow-up     RIGHT SACROILIAC JOINT INJECTION       HPI:  Low back/ LE pain.     H/o low back and LE pain. Failed conservative treatment.     S/P right L5-S1 hemilaminectomy and discectomy on 4/27/2023 by Dr. Talley.      Had recurrent disc herniation and subsequently underwent L5-S1 fusion in 11/3/2023.     Continues to have right sided low back pain and right LE pain.  Tingling/ numbness/ weakness + right LE.     Nursing notes and details of the pain history reviewed. Please see intake notes for details.     Previous treatments:   Physical Therapy : yes, continues HEP. Chiropractic treatment: yes     Medications: - NSAID's : yes                        - Membrane stabilizers : yes                       - Opioids : no                       - Adjuvants or Others : yes     Spine Surgeries: yes      Anticoagulation medications: no     H/o Smoking/ vaping: yes  H/o alcohol abuse : no  H/o Illicit drug use : CBD - daily. Occasional THC use +     Employment: Currently not working     Imaging studies:     X-ray LS spine: 2/1/2024:  IMPRESSION:  Posterior fusion of L5 and S1 with no hardware complication or malalignment.     MRI of LS spine: 6/10/2023:   (Has undergone L5-S1 fusion in nov 2023 since  this MRI)  IMPRESSION:  Postoperative change as detailed above.  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  This suggests a recurrent disc extrusion with surrounding  scar/granulation tissue causing right lateral recess stenosis and mass effect  on the right S1 nerve root.     MRI of the lumbar spine 3/15/2023: (has undergone surgery since this MRI)  Impression   1. Large right paracentral disc  these medications as prescribed. The patient verbalizes understanding.     MD HEATHER Bolton Daniel Anthony, MD

## 2024-05-01 RX ORDER — GABAPENTIN 300 MG/1
300 CAPSULE ORAL 3 TIMES DAILY
Qty: 90 CAPSULE | Refills: 2 | OUTPATIENT
Start: 2024-05-01

## 2024-05-07 ENCOUNTER — APPOINTMENT (OUTPATIENT)
Dept: OPERATING ROOM | Age: 39
End: 2024-05-07
Attending: ANESTHESIOLOGY
Payer: COMMERCIAL

## 2024-05-07 ENCOUNTER — HOSPITAL ENCOUNTER (OUTPATIENT)
Age: 39
Setting detail: OUTPATIENT SURGERY
Discharge: HOME | End: 2024-05-07
Attending: ANESTHESIOLOGY | Admitting: ANESTHESIOLOGY
Payer: COMMERCIAL

## 2024-05-07 VITALS
DIASTOLIC BLOOD PRESSURE: 50 MMHG | HEART RATE: 72 BPM | HEIGHT: 63 IN | SYSTOLIC BLOOD PRESSURE: 114 MMHG | OXYGEN SATURATION: 96 % | BODY MASS INDEX: 23.04 KG/M2 | RESPIRATION RATE: 16 BRPM | WEIGHT: 130 LBS | TEMPERATURE: 97.2 F

## 2024-05-07 PROCEDURE — 6360000002 HC RX W HCPCS: Performed by: ANESTHESIOLOGY

## 2024-05-07 PROCEDURE — 7100000010 HC PHASE II RECOVERY - FIRST 15 MIN: Performed by: ANESTHESIOLOGY

## 2024-05-07 PROCEDURE — 2709999900 HC NON-CHARGEABLE SUPPLY: Performed by: ANESTHESIOLOGY

## 2024-05-07 PROCEDURE — 62323 NJX INTERLAMINAR LMBR/SAC: CPT | Performed by: ANESTHESIOLOGY

## 2024-05-07 PROCEDURE — 6360000004 HC RX CONTRAST MEDICATION: Performed by: ANESTHESIOLOGY

## 2024-05-07 PROCEDURE — 3600000005 HC SURGERY LEVEL 5 BASE: Performed by: ANESTHESIOLOGY

## 2024-05-07 PROCEDURE — 2500000003 HC RX 250 WO HCPCS: Performed by: ANESTHESIOLOGY

## 2024-05-07 PROCEDURE — 7100000011 HC PHASE II RECOVERY - ADDTL 15 MIN: Performed by: ANESTHESIOLOGY

## 2024-05-07 RX ORDER — METHYLPREDNISOLONE ACETATE 40 MG/ML
INJECTION, SUSPENSION INTRA-ARTICULAR; INTRALESIONAL; INTRAMUSCULAR; SOFT TISSUE PRN
Status: DISCONTINUED | OUTPATIENT
Start: 2024-05-07 | End: 2024-05-07 | Stop reason: ALTCHOICE

## 2024-05-07 RX ORDER — SODIUM CHLORIDE 0.9 % (FLUSH) 0.9 %
5-40 SYRINGE (ML) INJECTION EVERY 12 HOURS SCHEDULED
Status: DISCONTINUED | OUTPATIENT
Start: 2024-05-07 | End: 2024-05-07 | Stop reason: HOSPADM

## 2024-05-07 RX ORDER — LIDOCAINE HYDROCHLORIDE 5 MG/ML
INJECTION, SOLUTION INFILTRATION; INTRAVENOUS PRN
Status: DISCONTINUED | OUTPATIENT
Start: 2024-05-07 | End: 2024-05-07 | Stop reason: ALTCHOICE

## 2024-05-07 RX ORDER — SODIUM CHLORIDE 0.9 % (FLUSH) 0.9 %
5-40 SYRINGE (ML) INJECTION PRN
Status: DISCONTINUED | OUTPATIENT
Start: 2024-05-07 | End: 2024-05-07 | Stop reason: HOSPADM

## 2024-05-07 RX ORDER — SODIUM CHLORIDE 9 MG/ML
INJECTION, SOLUTION INTRAVENOUS PRN
Status: DISCONTINUED | OUTPATIENT
Start: 2024-05-07 | End: 2024-05-07 | Stop reason: HOSPADM

## 2024-05-07 ASSESSMENT — PAIN DESCRIPTION - DESCRIPTORS: DESCRIPTORS: ACHING

## 2024-05-07 ASSESSMENT — PAIN - FUNCTIONAL ASSESSMENT
PAIN_FUNCTIONAL_ASSESSMENT: 0-10

## 2024-05-07 NOTE — OP NOTE
caudal area were prepped with chloraprep and draped in a sterile manner.  The sacral hiatus was identified and marked under AP fluoroscopy. A sterile gauze was placed in the midgluteal cleft for increased sterility. The overlying skin and subcutaneous tissues were anesthetized with 0.5% Lidocaine.  Under fluoroscopic guidance, a # 22 gauge 3.5 inch spinal needle was advanced into the sacral hiatus . After the needle passed through the sacrococcygeal ligament, the needle angle was advanced minimally. There was no evidence of paresthesia throughout the needle placement. After negative aspiration for blood and CSF was confirmed, 1 cc of Omnipaque 300 was injected to confirm proper epidural spread using both Ap(live fluoroscopy) and lateral views. A solution consisting of 0.5% Lidocaine and 60 mg DepoMedrol total of 6 cc was easily injected . There was a clear outline of the epidural space and visualization of the sacral roots. The needle was then removed and a sterile Band-Aid was applied to the puncture site.    Disposition the patient tolerated the procedure well and there were no complications . Vital signs remained stable throughout the procedure. The patient was escorted to the recovery area where they remained until discharge and written discharge instructions for the procedure were given.    Plan: April will return to our pain management center as scheduled.     Sajan Sandoval MD

## 2024-05-07 NOTE — DISCHARGE INSTRUCTIONS
[facility]  [phone]   Post-Pain Block/ Radiofrequency Home Going Instructions    1-Go home, rest for the remainder of the day  2-Please do not lift over 20 pounds the day of the injection  3-If you received sedation No: alcohol, driving, operating lawn mowers, plows, tractors or other dangerous equipment until next morning. Do not make important decisions or sign legal documents for 24 hours. You may experience light headedness, dizziness, nausea or sleepiness after sedation. Do not stay alone. A responsible adult must be with you for 24 hours. You could be nauseated from the medications you have received. Your IV site may be sore and bruised.    4-No dietary restrictions     5-Resume all medications the same day, blood thinners to be resumed 24 hours after injection    6-Keep the surgical site clean and dry, you may shower the next morning and remove the      dressing.     7- No sitz baths, tub baths or hot tubs/swimming for 24 hours.       8- If you have any pain at the injection site(s), application of an ice pack to the area should be       helpful, 20 minutes on/20 minutes off for next 48 hours.  9- Call [facility] pain management immediately at if you develop.  Fever greater than 100.4 F  Have bleeding or drainage from the puncture site  Have progressive Leg/arm numbness and or weakness  Loss of control of bowel and or bladder (wet/soil yourself)  Severe headache with inability to lift head  10-You may return to work the next day

## 2024-05-07 NOTE — INTERVAL H&P NOTE
Update History & Physical    The patient's History and Physical of April 24, 2024 was reviewed with the patient and I examined the patient. There was no change. The surgical site was confirmed by the patient and me.     Plan: The risks, benefits, expected outcome, and alternative to the recommended procedure have been discussed with the patient. Patient understands and wants to proceed with the procedure.     Electronically signed by Sajan Sandoval MD on 5/7/2024

## 2024-05-10 ENCOUNTER — TELEPHONE (OUTPATIENT)
Dept: PAIN MANAGEMENT | Age: 39
End: 2024-05-10

## 2024-05-10 DIAGNOSIS — M54.41 ACUTE BACK PAIN WITH SCIATICA, RIGHT: ICD-10-CM

## 2024-05-10 DIAGNOSIS — M54.16 LUMBAR RADICULOPATHY: Primary | ICD-10-CM

## 2024-05-10 DIAGNOSIS — M51.16 NEURITIS OR RADICULITIS DUE TO RUPTURE OF LUMBAR INTERVERTEBRAL DISC: ICD-10-CM

## 2024-05-15 ENCOUNTER — TELEPHONE (OUTPATIENT)
Dept: PAIN MANAGEMENT | Age: 39
End: 2024-05-15

## 2024-05-15 NOTE — TELEPHONE ENCOUNTER
April called stating that she had a fall at home and is now having severe pain in her back. She states she fell on her right side, not onto her back. She feels like she \"over extended\" her back and is worried the SCS was affected. Please advise.   11-Jun-2018 21:00

## 2024-05-28 ENCOUNTER — TELEPHONE (OUTPATIENT)
Dept: PAIN MANAGEMENT | Age: 39
End: 2024-05-28

## 2024-05-28 NOTE — TELEPHONE ENCOUNTER
April is requesting a refill of Gabapentin. I spoke with her, she said you would take over the management of this prescription. Previously ordered by Neuro.Last filled 4/3/24.  Last visit-procedure 5/7/24, next appt 5/31/24. Thank you

## 2024-05-29 RX ORDER — GABAPENTIN 300 MG/1
300 CAPSULE ORAL 3 TIMES DAILY
Qty: 90 CAPSULE | Refills: 0 | Status: SHIPPED | OUTPATIENT
Start: 2024-05-29 | End: 2024-08-27

## 2024-05-31 ENCOUNTER — OFFICE VISIT (OUTPATIENT)
Dept: PAIN MANAGEMENT | Age: 39
End: 2024-05-31
Payer: COMMERCIAL

## 2024-05-31 VITALS
TEMPERATURE: 96.9 F | WEIGHT: 130 LBS | HEART RATE: 81 BPM | HEIGHT: 62 IN | DIASTOLIC BLOOD PRESSURE: 70 MMHG | BODY MASS INDEX: 23.92 KG/M2 | RESPIRATION RATE: 18 BRPM | SYSTOLIC BLOOD PRESSURE: 112 MMHG | OXYGEN SATURATION: 97 %

## 2024-05-31 DIAGNOSIS — M51.36 DDD (DEGENERATIVE DISC DISEASE), LUMBAR: Primary | ICD-10-CM

## 2024-05-31 DIAGNOSIS — M53.3 SACROILIAC DYSFUNCTION: ICD-10-CM

## 2024-05-31 DIAGNOSIS — M54.16 LUMBAR RADICULAR PAIN: ICD-10-CM

## 2024-05-31 DIAGNOSIS — M47.817 LUMBOSACRAL SPONDYLOSIS WITHOUT MYELOPATHY: ICD-10-CM

## 2024-05-31 DIAGNOSIS — M96.1 POSTLAMINECTOMY SYNDROME, LUMBAR: ICD-10-CM

## 2024-05-31 PROCEDURE — G8420 CALC BMI NORM PARAMETERS: HCPCS | Performed by: ANESTHESIOLOGY

## 2024-05-31 PROCEDURE — 99213 OFFICE O/P EST LOW 20 MIN: CPT | Performed by: ANESTHESIOLOGY

## 2024-05-31 PROCEDURE — 1036F TOBACCO NON-USER: CPT | Performed by: ANESTHESIOLOGY

## 2024-05-31 PROCEDURE — G8427 DOCREV CUR MEDS BY ELIG CLIN: HCPCS | Performed by: ANESTHESIOLOGY

## 2024-05-31 RX ORDER — LEVOFLOXACIN 750 MG/1
TABLET, FILM COATED ORAL
COMMUNITY
Start: 2024-05-30

## 2024-05-31 RX ORDER — METHYLPREDNISOLONE 4 MG/1
TABLET ORAL
COMMUNITY
Start: 2024-05-30

## 2024-05-31 RX ORDER — BROMPHENIRAMINE MALEATE, PSEUDOEPHEDRINE HYDROCHLORIDE, AND DEXTROMETHORPHAN HYDROBROMIDE 2; 30; 10 MG/5ML; MG/5ML; MG/5ML
SYRUP ORAL
COMMUNITY
Start: 2024-05-30

## 2024-05-31 NOTE — PROGRESS NOTES
Ashley Espana presents to the [facility] on 5/31/2024. April is complaining of pain low back, right hip. The pain is constant. The pain is described as numbness, burning and pinching, electrical shocks. Pain is rated on her best day at a 3, on her worst day at a 7, and on average at a 4 on the VAS scale. She took her last dose of Neurontin and Robaxin  today.      Any procedures since your last visit: Yes, CAUDAL EPIDURAL STEROID INJECTION UNDER FLUOROSCOPIC GUIDANCE with 60 % relief.    She is not on NSAIDS and  is not on anticoagulation medications to include none   Pacemaker or defibrillator: No   Medication Contract and Consent for Opioid Use Documents Filed        No documents found                       LMP 03/20/2018      Patient's last menstrual period was 03/20/2018.

## 2024-05-31 NOTE — PROGRESS NOTES
[facility]        [address] [place] [address]  Dept: [phone]      Follow up Note      April D Bever     Date of Visit:  5/31/2024    CC:  Patient presents for follow up   Chief Complaint   Patient presents with    Follow-up     CAUDAL EPIDURAL STEROID INJECTION UNDER FLUOROSCOPIC GUIDANCE     HPI:  Low back/ LE pain.     H/o low back and LE pain. Failed conservative treatment.     S/P right L5-S1 hemilaminectomy and discectomy on 4/27/2023 by Dr. Talley.      Had recurrent disc herniation and subsequently underwent L5-S1 fusion in 11/3/2023.     Continues to have right sided low back pain and right LE pain.  Tingling/ numbness/ weakness + right LE.     Nursing notes and details of the pain history reviewed. Please see intake notes for details.     Previous treatments:   Physical Therapy : yes, continues HEP. Chiropractic treatment: yes     Medications: - NSAID's : yes                        - Membrane stabilizers : yes                       - Opioids : no                       - Adjuvants or Others : yes     Spine Surgeries: yes      Anticoagulation medications: no     H/o Smoking/ vaping: yes  H/o alcohol abuse : no  H/o Illicit drug use : CBD - daily. Occasional THC use +     Employment: Currently not working     Imaging studies:     X-ray LS spine: 2/1/2024:  IMPRESSION:  Posterior fusion of L5 and S1 with no hardware complication or malalignment.     MRI of LS spine: 6/10/2023:   (Has undergone L5-S1 fusion in nov 2023 since  this MRI)  IMPRESSION:  Postoperative change as detailed above.  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  This suggests a recurrent disc extrusion with surrounding  scar/granulation tissue causing right lateral recess stenosis and mass effect  on the right S1 nerve root.     MRI of the lumbar spine 3/15/2023: (has undergone surgery since this MRI)  Impression   1. Large

## 2024-07-19 ENCOUNTER — OFFICE VISIT (OUTPATIENT)
Dept: PAIN MANAGEMENT | Age: 39
End: 2024-07-19
Attending: ANESTHESIOLOGY
Payer: COMMERCIAL

## 2024-07-19 ENCOUNTER — HOSPITAL ENCOUNTER (OUTPATIENT)
Dept: MRI IMAGING | Age: 39
End: 2024-07-19
Attending: ANESTHESIOLOGY
Payer: COMMERCIAL

## 2024-07-19 VITALS
BODY MASS INDEX: 23.92 KG/M2 | RESPIRATION RATE: 18 BRPM | DIASTOLIC BLOOD PRESSURE: 80 MMHG | OXYGEN SATURATION: 98 % | WEIGHT: 130 LBS | SYSTOLIC BLOOD PRESSURE: 122 MMHG | HEIGHT: 62 IN | HEART RATE: 71 BPM | TEMPERATURE: 97.3 F

## 2024-07-19 DIAGNOSIS — M96.1 POSTLAMINECTOMY SYNDROME, LUMBAR: Primary | ICD-10-CM

## 2024-07-19 DIAGNOSIS — M54.16 LUMBAR RADICULAR PAIN: ICD-10-CM

## 2024-07-19 DIAGNOSIS — M53.3 SACROILIAC DYSFUNCTION: ICD-10-CM

## 2024-07-19 DIAGNOSIS — M51.36 DDD (DEGENERATIVE DISC DISEASE), LUMBAR: ICD-10-CM

## 2024-07-19 DIAGNOSIS — M96.1 POSTLAMINECTOMY SYNDROME, LUMBAR: ICD-10-CM

## 2024-07-19 DIAGNOSIS — M47.817 LUMBOSACRAL SPONDYLOSIS WITHOUT MYELOPATHY: ICD-10-CM

## 2024-07-19 DIAGNOSIS — Z98.1 S/P LUMBAR FUSION: ICD-10-CM

## 2024-07-19 DIAGNOSIS — F17.200 SMOKING: ICD-10-CM

## 2024-07-19 PROCEDURE — A9577 INJ MULTIHANCE: HCPCS | Performed by: RADIOLOGY

## 2024-07-19 PROCEDURE — 72158 MRI LUMBAR SPINE W/O & W/DYE: CPT

## 2024-07-19 PROCEDURE — G8427 DOCREV CUR MEDS BY ELIG CLIN: HCPCS | Performed by: ANESTHESIOLOGY

## 2024-07-19 PROCEDURE — 1036F TOBACCO NON-USER: CPT | Performed by: ANESTHESIOLOGY

## 2024-07-19 PROCEDURE — 99215 OFFICE O/P EST HI 40 MIN: CPT | Performed by: ANESTHESIOLOGY

## 2024-07-19 PROCEDURE — G8420 CALC BMI NORM PARAMETERS: HCPCS | Performed by: ANESTHESIOLOGY

## 2024-07-19 PROCEDURE — 99213 OFFICE O/P EST LOW 20 MIN: CPT | Performed by: ANESTHESIOLOGY

## 2024-07-19 PROCEDURE — 6360000004 HC RX CONTRAST MEDICATION: Performed by: RADIOLOGY

## 2024-07-19 RX ORDER — KETOROLAC TROMETHAMINE 30 MG/ML
30 INJECTION, SOLUTION INTRAMUSCULAR; INTRAVENOUS ONCE
Status: COMPLETED | OUTPATIENT
Start: 2024-07-19 | End: 2024-07-19

## 2024-07-19 RX ORDER — GABAPENTIN 300 MG/1
300 CAPSULE ORAL 3 TIMES DAILY
Qty: 90 CAPSULE | Refills: 2 | Status: SHIPPED | OUTPATIENT
Start: 2024-07-19 | End: 2024-10-17

## 2024-07-19 RX ORDER — METHYLPREDNISOLONE ACETATE 40 MG/ML
40 INJECTION, SUSPENSION INTRA-ARTICULAR; INTRALESIONAL; INTRAMUSCULAR; SOFT TISSUE ONCE
Status: COMPLETED | OUTPATIENT
Start: 2024-07-19 | End: 2024-07-19

## 2024-07-19 RX ORDER — METHYLPREDNISOLONE 4 MG/1
TABLET ORAL
Qty: 1 KIT | Refills: 0 | Status: SHIPPED | OUTPATIENT
Start: 2024-07-19 | End: 2024-07-25

## 2024-07-19 RX ORDER — OXYCODONE HYDROCHLORIDE AND ACETAMINOPHEN 5; 325 MG/1; MG/1
.5-1 TABLET ORAL 2 TIMES DAILY PRN
Qty: 20 TABLET | Refills: 0 | Status: SHIPPED | OUTPATIENT
Start: 2024-07-19 | End: 2024-08-03

## 2024-07-19 RX ADMIN — METHYLPREDNISOLONE ACETATE 40 MG: 40 INJECTION, SUSPENSION INTRA-ARTICULAR; INTRALESIONAL; INTRAMUSCULAR; INTRASYNOVIAL; SOFT TISSUE at 11:10

## 2024-07-19 RX ADMIN — KETOROLAC TROMETHAMINE 30 MG: 30 INJECTION, SOLUTION INTRAMUSCULAR; INTRAVENOUS at 11:10

## 2024-07-19 RX ADMIN — GADOBENATE DIMEGLUMINE 11 ML: 529 INJECTION, SOLUTION INTRAVENOUS at 14:59

## 2024-07-19 NOTE — PROGRESS NOTES
[facility]        [address] [place] [address]  Dept: [phone]      Follow up Note      April D Bever     Date of Visit:  7/19/2024    CC:  Patient presents for follow up   Chief Complaint   Patient presents with    Lower Back Pain     HPI:  Low back/ LE pain.     H/o low back and LE pain. Failed conservative treatment.     S/P right L5-S1 hemilaminectomy and discectomy on 4/27/2023 by Dr. Talley.      Had recurrent disc herniation and subsequently underwent L5-S1 fusion in 11/3/2023.     Continues to have right sided low back pain and right LE pain.  Tingling/ numbness/ weakness + right LE.     Nursing notes and details of the pain history reviewed. Please see intake notes for details.     Previous treatments:   Physical Therapy : yes, continues HEP. Chiropractic treatment: yes     Medications: - NSAID's : yes                        - Membrane stabilizers : yes                       - Opioids : no                       - Adjuvants or Others : yes     Spine Surgeries: yes      Anticoagulation medications: no     H/o Smoking/ vaping: yes  H/o alcohol abuse : no  H/o Illicit drug use : CBD - daily. Occasional THC use +     Employment: Currently not working     Imaging studies:     X-ray LS spine: 2/1/2024:  IMPRESSION:  Posterior fusion of L5 and S1 with no hardware complication or malalignment.     MRI of LS spine: 6/10/2023:   (Has undergone L5-S1 fusion in nov 2023 since  this MRI)  IMPRESSION:  Postoperative change as detailed above.  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  This suggests a recurrent disc extrusion with surrounding  scar/granulation tissue causing right lateral recess stenosis and mass effect  on the right S1 nerve root.     MRI of the lumbar spine 3/15/2023: (has undergone surgery since this MRI)  Impression   1. Large right paracentral disc protrusion at L5-S1 resulting in

## 2024-07-19 NOTE — PROGRESS NOTES
Ashley Espana presents to the [facility] on 7/19/2024. April is complaining of pain in her lower back that radiates to RLE. The pain is constant. The pain is described as burning, numb, and pinching with electrical shocks. Pain is rated on her best day at a 4, on her worst day at a 8, and on average at a 8 on the VAS scale. She took her last dose of Neurontin and Robaxin  today.      Any procedures since your last visit: No    She is not on NSAIDS and  is not on anticoagulation medications to include none and is managed by NA.     Pacemaker or defibrillator: No     Medication Contract and Consent for Opioid Use Documents Filed       Patient Documents       Type of Document Status Date Received Received By Description    Consent Opioid Use Received 5/31/2024 12:06 PM DARA GRANDA Consent Opioid Use                       Resp 18   Ht 1.575 m (5' 2.01\")   Wt 59 kg (130 lb)   LMP 03/20/2018   BMI 23.77 kg/m²      Patient's last menstrual period was 03/20/2018.

## 2024-07-23 ENCOUNTER — PREP FOR PROCEDURE (OUTPATIENT)
Dept: PAIN MANAGEMENT | Age: 39
End: 2024-07-23

## 2024-07-23 ENCOUNTER — OFFICE VISIT (OUTPATIENT)
Dept: PAIN MANAGEMENT | Age: 39
End: 2024-07-23
Payer: COMMERCIAL

## 2024-07-23 VITALS
RESPIRATION RATE: 18 BRPM | BODY MASS INDEX: 23.92 KG/M2 | HEART RATE: 83 BPM | TEMPERATURE: 97.3 F | WEIGHT: 130 LBS | OXYGEN SATURATION: 95 % | SYSTOLIC BLOOD PRESSURE: 120 MMHG | HEIGHT: 62 IN | DIASTOLIC BLOOD PRESSURE: 80 MMHG

## 2024-07-23 DIAGNOSIS — M54.16 LUMBAR RADICULAR PAIN: Primary | ICD-10-CM

## 2024-07-23 DIAGNOSIS — M47.817 LUMBOSACRAL SPONDYLOSIS WITHOUT MYELOPATHY: ICD-10-CM

## 2024-07-23 DIAGNOSIS — F17.200 SMOKING: ICD-10-CM

## 2024-07-23 DIAGNOSIS — M54.16 LUMBAR RADICULAR PAIN: ICD-10-CM

## 2024-07-23 DIAGNOSIS — M96.1 POSTLAMINECTOMY SYNDROME, LUMBAR: Primary | ICD-10-CM

## 2024-07-23 DIAGNOSIS — M53.3 SACROILIAC DYSFUNCTION: ICD-10-CM

## 2024-07-23 PROCEDURE — 99213 OFFICE O/P EST LOW 20 MIN: CPT | Performed by: ANESTHESIOLOGY

## 2024-07-23 PROCEDURE — G8427 DOCREV CUR MEDS BY ELIG CLIN: HCPCS | Performed by: ANESTHESIOLOGY

## 2024-07-23 PROCEDURE — G8420 CALC BMI NORM PARAMETERS: HCPCS | Performed by: ANESTHESIOLOGY

## 2024-07-23 PROCEDURE — 99214 OFFICE O/P EST MOD 30 MIN: CPT | Performed by: ANESTHESIOLOGY

## 2024-07-23 PROCEDURE — 1036F TOBACCO NON-USER: CPT | Performed by: ANESTHESIOLOGY

## 2024-07-23 RX ORDER — SODIUM CHLORIDE 0.9 % (FLUSH) 0.9 %
5-40 SYRINGE (ML) INJECTION EVERY 12 HOURS SCHEDULED
Status: CANCELLED | OUTPATIENT
Start: 2024-07-23

## 2024-07-23 RX ORDER — SODIUM CHLORIDE 9 MG/ML
INJECTION, SOLUTION INTRAVENOUS PRN
Status: CANCELLED | OUTPATIENT
Start: 2024-07-23

## 2024-07-23 RX ORDER — SODIUM CHLORIDE 0.9 % (FLUSH) 0.9 %
5-40 SYRINGE (ML) INJECTION PRN
Status: CANCELLED | OUTPATIENT
Start: 2024-07-23

## 2024-07-23 NOTE — PROGRESS NOTES
Ashley Espana presents to the [facility] on 7/23/2024. April is complaining of pain back radiating to RLE. The pain is constant. Has some relief from last week's medications.80The pain is described as burning and pinching, heavy numbness, shocks, weakness. Pain is rated on her best day at a 4, on her worst day at a 7, and on average at a 5 on the VAS scale. She took her last dose of Neurontin and Robaxin  today has not needed Percocet.  Taking Medrol dose pack    Any procedures since your last visit: No  She is not on NSAIDS and  is not on anticoagulation medications to include none   Pacemaker or defibrillator: No     Medication Contract and Consent for Opioid Use Documents Filed       Patient Documents       Type of Document Status Date Received Received By Description    Consent Opioid Use Received 5/31/2024 12:06 PM DARA GRANDA Consent Opioid Use                       Resp 18   Ht 1.575 m (5' 2\")   Wt 59 kg (130 lb)   LMP 03/20/2018   BMI 23.78 kg/m²      Patient's last menstrual period was 03/20/2018.  
SIJ injection in future.     If interventions does not give long term relief and pain continues to bother, consider SCS trial. Briefly discussed about this.    Percocet for as needed use for severe pain E -scribed # 20 on 7/19/2024 # 20.  Pain meds for prn use for severe pain to help with pain and functionality. RBA of opioid use discussed.      Methocarbamol for prn use.     Opioid agreement signed  4/24/2024.    UDS in follow up visits.     Counseling done regarding importance of regular exercise program walking cessation.     Discussed importance of smoking/ vaping cessation on spine health.     We discussed with the patient that combining opioids, benzodiazepines, alcohol, illicit drugs or sleep aids increases the risk of respiratory depression including death. We discussed that these medications may cause drowsiness, sedation or dizziness and have counseled the patient not to drive or operate machinery. We have discussed that these medications will be prescribed only by one provider. We have discussed with the patient about age related risk factors and have thoroughly discussed the importance of taking these medications as prescribed. The patient verbalizes understanding.     MD HEATHER Bolton Daniel Anthony, MD

## 2024-07-23 NOTE — H&P (VIEW-ONLY)
[facility]        [address] [place] [id number]  Dept: [phone]    Follow up Note      April D Bever     Date of Visit:  7/23/2024    CC:  Patient presents for follow up   Chief Complaint   Patient presents with    Back Pain     HPI:  Low back/ LE pain.     H/o low back and LE pain. Failed conservative treatment.     S/P right L5-S1 hemilaminectomy and discectomy on 4/27/2023 by Dr. Talley.      Had recurrent disc herniation and subsequently underwent L5-S1 fusion in 11/3/2023.     Continues to have right sided low back pain and right LE pain.  Tingling/ numbness/ weakness + right LE.     Nursing notes and details of the pain history reviewed. Please see intake notes for details.     Previous treatments:   Physical Therapy : yes, continues HEP. Chiropractic treatment: yes     Medications: - NSAID's : yes                        - Membrane stabilizers : yes                       - Opioids : no                       - Adjuvants or Others : yes     Spine Surgeries: yes      Anticoagulation medications: no     H/o Smoking/ vaping: yes  H/o alcohol abuse : no  H/o Illicit drug use : CBD - daily. Occasional THC use +     Employment: Currently not working     Imaging studies:    MRI of LS spine: 7/19/2024:  IMPRESSION:  1. Status post decompressive laminectomies with posterior interbody fusion at  L5-S1.  2. Posterior peridural fibrosis at L5-S1, at the site of the laminectomies.  3. No significant central canal stenosis.  4. Mild left neural foraminal stenosis at L4-5.     X-ray LS spine: 2/1/2024:  IMPRESSION:  Posterior fusion of L5 and S1 with no hardware complication or malalignment.     MRI of LS spine: 6/10/2023:   (Has undergone L5-S1 fusion in nov 2023 since  this MRI)  IMPRESSION:  Postoperative change as detailed above.  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  This suggests a recurrent disc

## 2024-07-30 ENCOUNTER — HOSPITAL ENCOUNTER (OUTPATIENT)
Dept: OPERATING ROOM | Age: 39
Setting detail: OUTPATIENT SURGERY
Discharge: HOME | End: 2024-07-30
Attending: ANESTHESIOLOGY
Payer: COMMERCIAL

## 2024-07-30 ENCOUNTER — HOSPITAL ENCOUNTER (OUTPATIENT)
Age: 39
Setting detail: OUTPATIENT SURGERY
Discharge: HOME | End: 2024-07-30
Attending: ANESTHESIOLOGY | Admitting: ANESTHESIOLOGY
Payer: COMMERCIAL

## 2024-07-30 VITALS
HEART RATE: 74 BPM | BODY MASS INDEX: 23.92 KG/M2 | WEIGHT: 130 LBS | SYSTOLIC BLOOD PRESSURE: 105 MMHG | OXYGEN SATURATION: 96 % | RESPIRATION RATE: 16 BRPM | HEIGHT: 62 IN | DIASTOLIC BLOOD PRESSURE: 56 MMHG

## 2024-07-30 DIAGNOSIS — M54.16 LUMBAR RADICULOPATHY: ICD-10-CM

## 2024-07-30 PROCEDURE — 7100000010 HC PHASE II RECOVERY - FIRST 15 MIN: Performed by: ANESTHESIOLOGY

## 2024-07-30 PROCEDURE — 62323 NJX INTERLAMINAR LMBR/SAC: CPT | Performed by: ANESTHESIOLOGY

## 2024-07-30 PROCEDURE — 3600000005 HC SURGERY LEVEL 5 BASE: Performed by: ANESTHESIOLOGY

## 2024-07-30 PROCEDURE — 6360000004 HC RX CONTRAST MEDICATION: Performed by: ANESTHESIOLOGY

## 2024-07-30 PROCEDURE — 2500000003 HC RX 250 WO HCPCS: Performed by: ANESTHESIOLOGY

## 2024-07-30 PROCEDURE — 2709999900 HC NON-CHARGEABLE SUPPLY: Performed by: ANESTHESIOLOGY

## 2024-07-30 PROCEDURE — 6360000002 HC RX W HCPCS: Performed by: ANESTHESIOLOGY

## 2024-07-30 PROCEDURE — 7100000011 HC PHASE II RECOVERY - ADDTL 15 MIN: Performed by: ANESTHESIOLOGY

## 2024-07-30 RX ORDER — SODIUM CHLORIDE 9 MG/ML
INJECTION, SOLUTION INTRAVENOUS PRN
Status: DISCONTINUED | OUTPATIENT
Start: 2024-07-30 | End: 2024-07-30 | Stop reason: HOSPADM

## 2024-07-30 RX ORDER — SODIUM CHLORIDE 0.9 % (FLUSH) 0.9 %
5-40 SYRINGE (ML) INJECTION PRN
Status: DISCONTINUED | OUTPATIENT
Start: 2024-07-30 | End: 2024-07-30 | Stop reason: HOSPADM

## 2024-07-30 RX ORDER — LIDOCAINE HYDROCHLORIDE 5 MG/ML
INJECTION, SOLUTION INFILTRATION; INTRAVENOUS PRN
Status: DISCONTINUED | OUTPATIENT
Start: 2024-07-30 | End: 2024-07-30 | Stop reason: ALTCHOICE

## 2024-07-30 RX ORDER — METHYLPREDNISOLONE ACETATE 40 MG/ML
INJECTION, SUSPENSION INTRA-ARTICULAR; INTRALESIONAL; INTRAMUSCULAR; SOFT TISSUE PRN
Status: DISCONTINUED | OUTPATIENT
Start: 2024-07-30 | End: 2024-07-30 | Stop reason: ALTCHOICE

## 2024-07-30 RX ORDER — SODIUM CHLORIDE 0.9 % (FLUSH) 0.9 %
5-40 SYRINGE (ML) INJECTION EVERY 12 HOURS SCHEDULED
Status: DISCONTINUED | OUTPATIENT
Start: 2024-07-30 | End: 2024-07-30 | Stop reason: HOSPADM

## 2024-07-30 ASSESSMENT — PAIN - FUNCTIONAL ASSESSMENT
PAIN_FUNCTIONAL_ASSESSMENT: 0-10
PAIN_FUNCTIONAL_ASSESSMENT: NONE - DENIES PAIN
PAIN_FUNCTIONAL_ASSESSMENT: NONE - DENIES PAIN

## 2024-07-30 NOTE — INTERVAL H&P NOTE
Update History & Physical    The patient's History and Physical of July 23, 2024 was reviewed with the patient and I examined the patient. There was no change. The surgical site was confirmed by the patient and me.     Plan: The risks, benefits, expected outcome, and alternative to the recommended procedure have been discussed with the patient. Patient understands and wants to proceed with the procedure.     Electronically signed by Sajan Sandoval MD on 7/30/2024

## 2024-07-30 NOTE — DISCHARGE INSTRUCTIONS
[facility]  [phone]   Post-Pain Block/ Radiofrequency Home Going Instructions    1-Go home, rest for the remainder of the day  2-Please do not lift over 20 pounds the day of the injection  3-If you received sedation No: alcohol, driving, operating lawn mowers, plows, tractors or other dangerous equipment until next morning. Do not make important decisions or sign legal documents for 24 hours. You may experience light headedness, dizziness, nausea or sleepiness after sedation. Do not stay alone. A responsible adult must be with you for 24 hours. You could be nauseated from the medications you have received. Your IV site may be sore and bruised.    4-No dietary restrictions     5-Resume all medications the same day, blood thinners to be resumed 24 hours after injection    6-Keep the surgical site clean and dry, you may shower the next morning and remove the      dressing.     7- No sitz baths, tub baths or hot tubs/swimming for 24 hours.       8- If you have any pain at the injection site(s), application of an ice pack to the area should be       helpful, 20 minutes on/20 minutes off for next 48 hours.  9- Call [facility] pain management immediately at if you develop.  Fever greater than 100.4 F  Have bleeding or drainage from the puncture site  Have progressive Leg/arm numbness and or weakness  Loss of control of bowel and or bladder (wet/soil yourself)  Severe headache with inability to lift head  10-You may return to work the next day         Nausea and Vomiting After Surgery: Care Instructions  Your Care Instructions     After you've had surgery, you may feel sick to your stomach (nauseated) or you may vomit. Sometimes anesthesia can make you feel sick. It's a common side effect and often doesn't last long. Pain also can make you feel sick or vomit. After the anesthesia wears off, you may feel pain from the incision (cut). That pain could then upset your stomach. Taking pain medicine

## 2024-07-30 NOTE — OP NOTE
Operative Note      Patient: Ashley Espana  YOB: 1985  MRN: [id number]    Date of Procedure: 2024    Pre-Op Diagnosis Codes:     * Lumbar radiculopathy [M54.16]    Post-Op Diagnosis: Same       Procedure(s):  CAUDAL EPIDURAL STEROID INJECTION UNDER FLUOROSCOPIC GUIDANCE    Surgeon(s):  Sajan Sandoval MD    Assistant:   * No surgical staff found *    Anesthesia: Local    Estimated Blood Loss (mL): Minimal    Complications: None    Specimens:   * No specimens in log *    Implants:  * No implants in log *      Drains: * No LDAs found *    Findings:  Infection Present At Time Of Surgery (PATOS) (choose all levels that have infection present):  No infection present  Other Findings: good needle placement    Detailed Description of Procedure:   2024    Patient: Ashley Espana  :  1985  Age:  39 y.o.  Sex:  female     PRE-OPERATIVE DIAGNOSIS:  Lumbar DDD, Lumbar radicular pain    POST-OPERATIVE DIAGNOSIS: Same.    PROCEDURE PERFORMED:  Therapeutic Caudal Epidural done under fluoroscopic guidance.    SURGEON:   Sajan Sandoval MD    ANESTHESIA: Local    ESTIMATED BLOOD LOSS: None.    BRIEF HISTORY: Ashley Espana comes in today for the  therapeutic caudal epidural steroid injection under fluorsoscopic guidance. After discussing the potential risks and benefits of the procedure with the patient  April did request that we proceed. A complete History & Physical was reviewed and it is unchanged.    DESCRIPTION OF PROCEDURE:    After confirming written and informed consent, a time-out was performed and April’s name and date of birth, the procedure to be performed as well as the plan for the location of the needle insertion were confirmed.    Patient was brought into the procedure room and was placed in the prone position on a fluoroscopy table.    Standard monitors were placed and vital signs were observed throughout the procedure  The lumbosacral and caudal area were prepped with chloraprep

## 2024-09-26 ENCOUNTER — HOSPITAL ENCOUNTER (OUTPATIENT)
Age: 39
Discharge: HOME | End: 2024-09-26
Payer: COMMERCIAL

## 2024-09-26 LAB
25(OH)D3 SERPL-MCNC: 32.5 NG/ML (ref 30–100)
ESTRADIOL LEVEL: 125.1 PG/ML
FSH SERPL-ACNC: 4.2 MIU/ML
T4 FREE SERPL-MCNC: 1.3 NG/DL (ref 0.9–1.7)
TSH SERPL DL<=0.05 MIU/L-ACNC: 0.75 UIU/ML (ref 0.27–4.2)

## 2024-09-26 PROCEDURE — 83001 ASSAY OF GONADOTROPIN (FSH): CPT

## 2024-09-26 PROCEDURE — 36415 COLL VENOUS BLD VENIPUNCTURE: CPT

## 2024-09-26 PROCEDURE — 86376 MICROSOMAL ANTIBODY EACH: CPT

## 2024-09-26 PROCEDURE — 84443 ASSAY THYROID STIM HORMONE: CPT

## 2024-09-26 PROCEDURE — 82670 ASSAY OF TOTAL ESTRADIOL: CPT

## 2024-09-26 PROCEDURE — 82306 VITAMIN D 25 HYDROXY: CPT

## 2024-09-26 PROCEDURE — 84439 ASSAY OF FREE THYROXINE: CPT

## 2024-09-30 LAB — THYROPEROXIDASE AB SERPL IA-ACNC: <4 IU/ML (ref 0–25)

## 2025-03-09 ENCOUNTER — APPOINTMENT (OUTPATIENT)
Dept: GENERAL RADIOLOGY | Age: 40
End: 2025-03-09
Payer: COMMERCIAL

## 2025-03-09 ENCOUNTER — HOSPITAL ENCOUNTER (EMERGENCY)
Age: 40
Discharge: HOME | End: 2025-03-09
Payer: COMMERCIAL

## 2025-03-09 VITALS
WEIGHT: 120 LBS | HEART RATE: 96 BPM | DIASTOLIC BLOOD PRESSURE: 86 MMHG | OXYGEN SATURATION: 97 % | RESPIRATION RATE: 19 BRPM | TEMPERATURE: 97.8 F | BODY MASS INDEX: 21.95 KG/M2 | SYSTOLIC BLOOD PRESSURE: 104 MMHG

## 2025-03-09 DIAGNOSIS — S39.012A STRAIN OF LUMBAR REGION, INITIAL ENCOUNTER: Primary | ICD-10-CM

## 2025-03-09 PROCEDURE — 96372 THER/PROPH/DIAG INJ SC/IM: CPT

## 2025-03-09 PROCEDURE — 6360000002 HC RX W HCPCS: Performed by: PHYSICIAN ASSISTANT

## 2025-03-09 PROCEDURE — 99284 EMERGENCY DEPT VISIT MOD MDM: CPT

## 2025-03-09 PROCEDURE — 6370000000 HC RX 637 (ALT 250 FOR IP): Performed by: PHYSICIAN ASSISTANT

## 2025-03-09 PROCEDURE — 72100 X-RAY EXAM L-S SPINE 2/3 VWS: CPT

## 2025-03-09 RX ORDER — HYDROCODONE BITARTRATE AND ACETAMINOPHEN 5; 325 MG/1; MG/1
1 TABLET ORAL EVERY 6 HOURS PRN
Qty: 12 TABLET | Refills: 0 | Status: SHIPPED | OUTPATIENT
Start: 2025-03-09 | End: 2025-03-12

## 2025-03-09 RX ORDER — HYDROCODONE BITARTRATE AND ACETAMINOPHEN 5; 325 MG/1; MG/1
1 TABLET ORAL ONCE
Status: COMPLETED | OUTPATIENT
Start: 2025-03-09 | End: 2025-03-09

## 2025-03-09 RX ORDER — PREDNISONE 10 MG/1
TABLET ORAL
Qty: 30 TABLET | Refills: 0 | Status: SHIPPED | OUTPATIENT
Start: 2025-03-09 | End: 2025-03-19

## 2025-03-09 RX ORDER — KETOROLAC TROMETHAMINE 30 MG/ML
30 INJECTION, SOLUTION INTRAMUSCULAR; INTRAVENOUS ONCE
Status: COMPLETED | OUTPATIENT
Start: 2025-03-09 | End: 2025-03-09

## 2025-03-09 RX ORDER — PREDNISONE 20 MG/1
60 TABLET ORAL ONCE
Status: COMPLETED | OUTPATIENT
Start: 2025-03-09 | End: 2025-03-09

## 2025-03-09 RX ORDER — ORPHENADRINE CITRATE 30 MG/ML
60 INJECTION INTRAMUSCULAR; INTRAVENOUS ONCE
Status: COMPLETED | OUTPATIENT
Start: 2025-03-09 | End: 2025-03-09

## 2025-03-09 RX ORDER — METHOCARBAMOL 750 MG/1
750 TABLET, FILM COATED ORAL 4 TIMES DAILY
Qty: 40 TABLET | Refills: 0 | Status: SHIPPED | OUTPATIENT
Start: 2025-03-09 | End: 2025-03-19

## 2025-03-09 RX ADMIN — PREDNISONE 60 MG: 20 TABLET ORAL at 17:14

## 2025-03-09 RX ADMIN — ORPHENADRINE CITRATE 60 MG: 60 INJECTION INTRAMUSCULAR; INTRAVENOUS at 17:15

## 2025-03-09 RX ADMIN — KETOROLAC TROMETHAMINE 30 MG: 30 INJECTION INTRAMUSCULAR; INTRAVENOUS at 18:06

## 2025-03-09 RX ADMIN — HYDROCODONE BITARTRATE AND ACETAMINOPHEN 1 TABLET: 5; 325 TABLET ORAL at 17:14

## 2025-03-09 NOTE — ED PROVIDER NOTES
Independent SILVANA Visit.        HPI:  3/9/25, Time: 4:58 PM EDT         April D Jovi is a 40 y.o. female presenting to the ED for lower back pain, beginning 2 days ago.  The complaint has been persistent, moderate in severity, and worsened by movement of lumbar spine, standing, walking.  History is provided by patient emergency department today.  States that she was vomiting 2 days ago and thinks that she pulled something in her lower back.  Has been trying Aleve and ibuprofen at home without much relief of symptoms.  Reports a history of lumbar fusions and stimulator placement 2 years ago.  States that the pain is radiating down her right leg to her foot.  No weakness in lower extremities.  No bowel or bladder changes.  Afebrile without recent travel or sick contacts.  Patient denies all other symptoms at this time.    Review of Systems:   A complete review of systems was performed and pertinent positives and negatives are stated within HPI, all other systems reviewed and are negative.          --------------------------------------------- PAST HISTORY ---------------------------------------------  Past Medical History:  has a past medical history of Chronic pain, Lumbar pain, OCD (obsessive compulsive disorder), and Osteopenia.    Past Surgical History:  has a past surgical history that includes Appendectomy; Tubal ligation; Foot surgery (Bilateral); Ovarian cyst surgery; Nose surgery (Bilateral, 08/13/2015); other surgical history (N/A, 07/19/2017); Hysterectomy; Pain management procedure (Right, 03/20/2023); laminectomy (Right, 04/28/2023); Lumbar spine surgery (N/A, 11/03/2023); Pain management procedure (Right, 02/27/2024); Back Injection (Right, 04/09/2024); Pain management procedure (N/A, 05/07/2024); Pain management procedure (N/A, 07/30/2024); Spinal fusion (11/2023); lumbar discectomy (05/2023); and Spinal cord stimulator implant (01/2025).    Social History:  reports that she quit smoking about 17 months

## 2025-04-21 NOTE — ANESTHESIA PRE PROCEDURE
Addended by: JAVI FRANCES IV on: 4/21/2025 09:49 AM     Modules accepted: Orders    
lb (54.9 kg)   04/14/23 120 lb (54.4 kg)   04/06/23 125 lb (56.7 kg)     Body mass index is 21.43 kg/m². CBC:   Lab Results   Component Value Date/Time    WBC 6.1 04/26/2023 10:00 AM    RBC 5.05 04/26/2023 10:00 AM    HGB 16.2 04/26/2023 10:00 AM    HCT 50.5 04/26/2023 10:00 AM    .0 04/26/2023 10:00 AM    RDW 13.2 04/26/2023 10:00 AM     04/26/2023 10:00 AM       CMP:   Lab Results   Component Value Date/Time     04/26/2023 10:00 AM    K 4.8 04/26/2023 10:00 AM     04/26/2023 10:00 AM    CO2 30 04/26/2023 10:00 AM    BUN 11 04/26/2023 10:00 AM    CREATININE 0.7 04/26/2023 10:00 AM    GFRAA >60 12/20/2014 06:00 AM    LABGLOM >60 04/26/2023 10:00 AM    GLUCOSE 86 04/26/2023 10:00 AM    PROT 6.6 12/20/2014 06:00 AM    CALCIUM 9.8 04/26/2023 10:00 AM    BILITOT 0.3 12/20/2014 06:00 AM    ALKPHOS 47 12/20/2014 06:00 AM    AST 14 12/20/2014 06:00 AM    ALT 11 12/20/2014 06:00 AM       POC Tests: No results for input(s): POCGLU, POCNA, POCK, POCCL, POCBUN, POCHEMO, POCHCT in the last 72 hours.     Coags:   Lab Results   Component Value Date/Time    PROTIME 10.6 04/26/2023 10:00 AM    INR 1.0 04/26/2023 10:00 AM    APTT 33.6 07/12/2017 02:00 PM       HCG (If Applicable):   Lab Results   Component Value Date    PREGTESTUR neg 07/19/2017        ABGs: No results found for: PHART, PO2ART, WQW5NYD, XXK5YCK, BEART, A8DYIHRE     Type & Screen (If Applicable):  No results found for: LABABO, LABRH    Drug/Infectious Status (If Applicable):  No results found for: HIV, HEPCAB    COVID-19 Screening (If Applicable):   Lab Results   Component Value Date/Time    COVID19 NOT DETECTED 02/21/2023 10:59 AM           Anesthesia Evaluation  Patient summary reviewed no history of anesthetic complications:   Airway: Mallampati: I  TM distance: >3 FB   Neck ROM: full     Dental: normal exam         Pulmonary: breath sounds clear to auscultation  (+) current smoker          Patient did not smoke on day of

## 2025-06-24 ENCOUNTER — APPOINTMENT (OUTPATIENT)
Dept: CT IMAGING | Age: 40
End: 2025-06-24
Payer: COMMERCIAL

## 2025-06-24 ENCOUNTER — HOSPITAL ENCOUNTER (EMERGENCY)
Age: 40
Discharge: HOME | End: 2025-06-24
Payer: COMMERCIAL

## 2025-06-24 VITALS
TEMPERATURE: 97.9 F | OXYGEN SATURATION: 97 % | SYSTOLIC BLOOD PRESSURE: 115 MMHG | HEART RATE: 68 BPM | RESPIRATION RATE: 18 BRPM | DIASTOLIC BLOOD PRESSURE: 54 MMHG

## 2025-06-24 DIAGNOSIS — M54.16 LUMBAR RADICULOPATHY: Primary | ICD-10-CM

## 2025-06-24 DIAGNOSIS — M48.061 FORAMINAL STENOSIS OF LUMBAR REGION: ICD-10-CM

## 2025-06-24 LAB
ANION GAP SERPL CALCULATED.3IONS-SCNC: 11 MMOL/L (ref 7–16)
BASOPHILS # BLD: 0.02 K/UL (ref 0–0.2)
BASOPHILS NFR BLD: 0 % (ref 0–2)
BILIRUB UR QL STRIP: NEGATIVE
BUN SERPL-MCNC: 8 MG/DL (ref 6–20)
CALCIUM SERPL-MCNC: 9 MG/DL (ref 8.6–10)
CHLORIDE SERPL-SCNC: 102 MMOL/L (ref 98–107)
CLARITY UR: CLEAR
CO2 SERPL-SCNC: 25 MMOL/L (ref 22–29)
COLOR UR: YELLOW
CREAT SERPL-MCNC: 0.5 MG/DL (ref 0.5–1)
EOSINOPHIL # BLD: 0 K/UL (ref 0.05–0.5)
EOSINOPHILS RELATIVE PERCENT: 0 % (ref 0–6)
EPI CELLS #/AREA URNS HPF: ABNORMAL /HPF
ERYTHROCYTE [DISTWIDTH] IN BLOOD BY AUTOMATED COUNT: 12.4 % (ref 11.5–15)
GFR, ESTIMATED: >90 ML/MIN/1.73M2
GLUCOSE SERPL-MCNC: 199 MG/DL (ref 74–99)
GLUCOSE UR STRIP-MCNC: 250 MG/DL
HCG, URINE, POC: NEGATIVE
HCT VFR BLD AUTO: 42.2 % (ref 34–48)
HGB BLD-MCNC: 14.5 G/DL (ref 11.5–15.5)
HGB UR QL STRIP.AUTO: NEGATIVE
IMM GRANULOCYTES # BLD AUTO: 0.03 K/UL (ref 0–0.58)
IMM GRANULOCYTES NFR BLD: 0 % (ref 0–5)
KETONES UR STRIP-MCNC: ABNORMAL MG/DL
LEUKOCYTE ESTERASE UR QL STRIP: NEGATIVE
LYMPHOCYTES NFR BLD: 0.6 K/UL (ref 1.5–4)
LYMPHOCYTES RELATIVE PERCENT: 6 % (ref 20–42)
Lab: NORMAL
MCH RBC QN AUTO: 32.9 PG (ref 26–35)
MCHC RBC AUTO-ENTMCNC: 34.4 G/DL (ref 32–34.5)
MCV RBC AUTO: 95.7 FL (ref 80–99.9)
MONOCYTES NFR BLD: 0.09 K/UL (ref 0.1–0.95)
MONOCYTES NFR BLD: 1 % (ref 2–12)
NEGATIVE QC PASS/FAIL: NORMAL
NEUTROPHILS NFR BLD: 93 % (ref 43–80)
NEUTS SEG NFR BLD: 9.05 K/UL (ref 1.8–7.3)
NITRITE UR QL STRIP: NEGATIVE
PH UR STRIP: 6.5 [PH] (ref 5–8)
PLATELET # BLD AUTO: 291 K/UL (ref 130–450)
PMV BLD AUTO: 9.9 FL (ref 7–12)
POSITIVE QC PASS/FAIL: NORMAL
POTASSIUM SERPL-SCNC: 4.1 MMOL/L (ref 3.5–5.1)
PROT UR STRIP-MCNC: NEGATIVE MG/DL
RBC # BLD AUTO: 4.41 M/UL (ref 3.5–5.5)
RBC #/AREA URNS HPF: ABNORMAL /HPF
SODIUM SERPL-SCNC: 138 MMOL/L (ref 136–145)
SP GR UR STRIP: 1.01 (ref 1–1.03)
UROBILINOGEN UR STRIP-ACNC: 0.2 EU/DL (ref 0–1)
WBC #/AREA URNS HPF: ABNORMAL /HPF
WBC OTHER # BLD: 9.8 K/UL (ref 4.5–11.5)

## 2025-06-24 PROCEDURE — 72132 CT LUMBAR SPINE W/DYE: CPT

## 2025-06-24 PROCEDURE — 81001 URINALYSIS AUTO W/SCOPE: CPT

## 2025-06-24 PROCEDURE — 6360000002 HC RX W HCPCS: Performed by: PHYSICIAN ASSISTANT

## 2025-06-24 PROCEDURE — 6360000004 HC RX CONTRAST MEDICATION: Performed by: RADIOLOGY

## 2025-06-24 PROCEDURE — 80048 BASIC METABOLIC PNL TOTAL CA: CPT

## 2025-06-24 PROCEDURE — 85025 COMPLETE CBC W/AUTO DIFF WBC: CPT

## 2025-06-24 PROCEDURE — 2500000003 HC RX 250 WO HCPCS: Performed by: PHYSICIAN ASSISTANT

## 2025-06-24 PROCEDURE — 6370000000 HC RX 637 (ALT 250 FOR IP): Performed by: PHYSICIAN ASSISTANT

## 2025-06-24 PROCEDURE — 96375 TX/PRO/DX INJ NEW DRUG ADDON: CPT

## 2025-06-24 PROCEDURE — 96374 THER/PROPH/DIAG INJ IV PUSH: CPT

## 2025-06-24 PROCEDURE — 87086 URINE CULTURE/COLONY COUNT: CPT

## 2025-06-24 PROCEDURE — 99285 EMERGENCY DEPT VISIT HI MDM: CPT

## 2025-06-24 RX ORDER — PREDNISONE 10 MG/1
TABLET ORAL
Qty: 30 TABLET | Refills: 0 | Status: SHIPPED | OUTPATIENT
Start: 2025-06-24 | End: 2025-07-04

## 2025-06-24 RX ORDER — MORPHINE SULFATE 2 MG/ML
4 INJECTION, SOLUTION INTRAMUSCULAR; INTRAVENOUS ONCE
Refills: 0 | Status: COMPLETED | OUTPATIENT
Start: 2025-06-24 | End: 2025-06-24

## 2025-06-24 RX ORDER — ORPHENADRINE CITRATE 100 MG/1
100 TABLET ORAL ONCE
Status: COMPLETED | OUTPATIENT
Start: 2025-06-24 | End: 2025-06-24

## 2025-06-24 RX ORDER — HYDROCODONE BITARTRATE AND ACETAMINOPHEN 5; 325 MG/1; MG/1
1 TABLET ORAL EVERY 4 HOURS PRN
Qty: 18 TABLET | Refills: 0 | Status: SHIPPED | OUTPATIENT
Start: 2025-06-24 | End: 2025-06-27

## 2025-06-24 RX ORDER — IOPAMIDOL 755 MG/ML
75 INJECTION, SOLUTION INTRAVASCULAR
Status: COMPLETED | OUTPATIENT
Start: 2025-06-24 | End: 2025-06-24

## 2025-06-24 RX ORDER — DEXAMETHASONE SODIUM PHOSPHATE 10 MG/ML
10 INJECTION, SOLUTION INTRA-ARTICULAR; INTRALESIONAL; INTRAMUSCULAR; INTRAVENOUS; SOFT TISSUE ONCE
Status: COMPLETED | OUTPATIENT
Start: 2025-06-24 | End: 2025-06-24

## 2025-06-24 RX ADMIN — IOPAMIDOL 75 ML: 755 INJECTION, SOLUTION INTRAVENOUS at 18:09

## 2025-06-24 RX ADMIN — DEXAMETHASONE SODIUM PHOSPHATE 10 MG: 10 INJECTION INTRAMUSCULAR; INTRAVENOUS at 17:15

## 2025-06-24 RX ADMIN — ORPHENADRINE CITRATE 100 MG: 100 TABLET, EXTENDED RELEASE ORAL at 17:16

## 2025-06-24 RX ADMIN — MORPHINE SULFATE 4 MG: 2 INJECTION, SOLUTION INTRAMUSCULAR; INTRAVENOUS at 17:16

## 2025-06-24 NOTE — ED PROVIDER NOTES
Independent SILVANA Visit.      HPI:  6/24/25,   Time: 4:10 PM EDT         April D Jovi is a 40 y.o. female presenting to the ED for back pain radiating to left upper, beginning 1 day ago.  The complaint has been persistent, severe in severity, and worsened by movement of back sitting standing  Patient states yesterday she leaned over she noticed worsening pain of her lower back that radiates down the left upper leg which is new.  She has history of back surgery in the past with cages and rods as well as a stimulator..  She has history of chronic urinary bowel incontinence which she states is worse over the last day with increasing diarrhea.  She also has chronic numbness tingling weakness of the right leg with pain.  She states the pain does radiate towards the abdomen no nausea vomiting or constipation present pain but bilaterally and up the back she has had urinary frequency denies any urgency or burning.  Denies any fever chills.      ROS:   Pertinent positives and negatives are stated within HPI, all other systems reviewed and are negative.  --------------------------------------------- PAST HISTORY ---------------------------------------------  Past Medical History:  has a past medical history of Chronic pain, Lumbar pain, OCD (obsessive compulsive disorder), and Osteopenia.    Past Surgical History:  has a past surgical history that includes Appendectomy; Tubal ligation; Foot surgery (Bilateral); Ovarian cyst surgery; Nose surgery (Bilateral, 08/13/2015); other surgical history (N/A, 07/19/2017); Hysterectomy; Pain management procedure (Right, 03/20/2023); laminectomy (Right, 04/28/2023); Lumbar spine surgery (N/A, 11/03/2023); Pain management procedure (Right, 02/27/2024); Back Injection (Right, 04/09/2024); Pain management procedure (N/A, 05/07/2024); Pain management procedure (N/A, 07/30/2024); Spinal fusion (11/2023); lumbar discectomy (05/2023); and Spinal cord stimulator implant (01/2025).    Social

## 2025-06-26 LAB
MICROORGANISM SPEC CULT: ABNORMAL
MICROORGANISM SPEC CULT: ABNORMAL
SERVICE CMNT-IMP: ABNORMAL
SPECIMEN DESCRIPTION: ABNORMAL

## 2025-08-01 ENCOUNTER — TELEPHONE (OUTPATIENT)
Dept: NEUROSURGERY | Age: 40
End: 2025-08-01

## (undated) DEVICE — APPLICATOR MEDICATED 10.5 CC SOLUTION HI LT ORNG CHLORAPREP

## (undated) DEVICE — SHEET,DRAPE,40X58,STERILE: Brand: MEDLINE

## (undated) DEVICE — ELECTRODE PT RET AD L9FT HI MOIST COND ADH HYDRGEL CORDED

## (undated) DEVICE — GLOVE ORANGE PI 7 1/2   MSG9075

## (undated) DEVICE — APPLICATOR PREP 26ML 0.7% IOD POVACRYLEX 74% ISO ALC ST

## (undated) DEVICE — LUMBAR LAMINECTOMY: Brand: MEDLINE INDUSTRIES, INC.

## (undated) DEVICE — 6 ML SYRINGE LUER-LOCK TIP: Brand: MONOJECT

## (undated) DEVICE — DRAPE,REIN 53X77,STERILE: Brand: MEDLINE

## (undated) DEVICE — COUNTER NDL 10 COUNT HLD 20 FOAM BLK SGL MAG

## (undated) DEVICE — NON-DEHP CATHETER EXTENSION SET, MALE LUER LOCK ADAPTER

## (undated) DEVICE — 12 ML SYRINGE,LUER-LOCK TIP: Brand: MONOJECT

## (undated) DEVICE — BLADE ES L6IN ELASTOMERIC COAT EXT DURABLE BEND UPTO 90DEG

## (undated) DEVICE — GAUZE,SPONGE,4"X4",12PLY,STERILE,LF,2'S: Brand: MEDLINE

## (undated) DEVICE — SYRINGE, LUER LOCK, 5ML: Brand: MEDLINE

## (undated) DEVICE — 3M(TM) MEDIPORE(TM) +PAD SOFT CLOTH ADHESIVE WOUND DRESSING 3569: Brand: 3M™ MEDIPORE™

## (undated) DEVICE — 3M™ RED DOT™ MONITORING ELECTRODE WITH FOAM TAPE AND STICKY GEL 2560, 50/BAG, 20/CASE, 72/PLT: Brand: RED DOT™

## (undated) DEVICE — NEEDLE HYPO 25GA L1.5IN BLU POLYPR HUB S STL REG BVL STR

## (undated) DEVICE — DECANTER BAG 9": Brand: MEDLINE INDUSTRIES, INC.

## (undated) DEVICE — JACKSON TABLE POSITIONER KIT: Brand: MEDLINE INDUSTRIES, INC.

## (undated) DEVICE — MARKER,SKIN,WI/RULER AND LABELS: Brand: MEDLINE

## (undated) DEVICE — TUBING SUCT 12FR MAL ALUM SHFT FN CAP VENT UNIV CONN W/ OBT

## (undated) DEVICE — HYPODERMIC SAFETY NEEDLE: Brand: MAGELLAN

## (undated) DEVICE — KIT EVAC 400CC DIA1/8IN H PAT 12.5IN 3 SPR RND SHP PVC DRN

## (undated) DEVICE — BANDAGE ADH W1XL3IN NAT FAB WVN FLX DURABLE N ADH PD SEAL

## (undated) DEVICE — TOWEL OR BLUEE 16X26IN ST 8 PACK ORB08 16X26ORTWL

## (undated) DEVICE — NEEDLE HYPO 18GA L1.5IN PNK POLYPR HUB S STL REG BVL STR

## (undated) DEVICE — 3 ML SYRINGE LUER-LOCK TIP: Brand: MONOJECT

## (undated) DEVICE — 3M™ IOBAN™ 2 ANTIMICROBIAL INCISE DRAPE 6650EZ: Brand: IOBAN™ 2

## (undated) DEVICE — NEEDLE, QUINCKE, 22GX5": Brand: MEDLINE

## (undated) DEVICE — TUBING, SUCTION, 3/16" X 12', STRAIGHT: Brand: MEDLINE

## (undated) DEVICE — SPHERE EYE 1 MRK GUIDANCE PASS STEALTHSTATION 1PK/EA

## (undated) DEVICE — GLOVE SURG 8.5 PF POLYMER WHT STRL SIGN LTX ESSENTIAL LTX

## (undated) DEVICE — NEEDLE SPNL L3.5IN PNK HUB S STL REG WALL FIT STYL W/ QNCKE

## (undated) DEVICE — NEEDLE HYPO 18GA L1.5IN PNK POLYPR HUB S STL THN WALL FILL

## (undated) DEVICE — SYRINGE 20ML LL S/C 50

## (undated) DEVICE — Device

## (undated) DEVICE — GLOVE ORANGE PI 8   MSG9080

## (undated) DEVICE — PREMIUM WET SKIN PREP TRAY: Brand: MEDLINE INDUSTRIES, INC.

## (undated) DEVICE — 3M(TM) MEDIPORE(TM) +PAD SOFT CLOTH ADHESIVE WOUND DRESSING 3570: Brand: 3M™ MEDIPORE™

## (undated) DEVICE — TOWEL,OR,DSP,ST,BLUE,STD,6/PK,12PK/CS: Brand: MEDLINE

## (undated) DEVICE — PROBE BALL TIP STIMULATING 25

## (undated) DEVICE — MARKER SURG PASS SPHR NDI

## (undated) DEVICE — Device: Brand: PORTEX

## (undated) DEVICE — 5.0MM PRECISION ROUND

## (undated) DEVICE — ADHESIVE SKIN CLOSURE TOP 36 CC HI VISC DERMBND MINI

## (undated) DEVICE — BOWL ASSY BM210 DUAL BLADE DISPOSABLE: Brand: MIDAS REX™

## (undated) DEVICE — GOWN,SIRUS,FABRNF,L,20/CS: Brand: MEDLINE

## (undated) DEVICE — NDL, WHITACRE SPINAL, 22GX3.5": Brand: MEDLINE